# Patient Record
Sex: MALE | Race: WHITE | NOT HISPANIC OR LATINO | Employment: FULL TIME | URBAN - METROPOLITAN AREA
[De-identification: names, ages, dates, MRNs, and addresses within clinical notes are randomized per-mention and may not be internally consistent; named-entity substitution may affect disease eponyms.]

---

## 2019-07-01 DIAGNOSIS — M79.672 BILATERAL FOOT PAIN: Primary | ICD-10-CM

## 2019-07-01 DIAGNOSIS — M79.671 BILATERAL FOOT PAIN: Primary | ICD-10-CM

## 2019-07-02 ENCOUNTER — OFFICE VISIT (OUTPATIENT)
Dept: ORTHOPEDICS | Facility: CLINIC | Age: 29
End: 2019-07-02
Payer: OTHER MISCELLANEOUS

## 2019-07-02 ENCOUNTER — TELEPHONE (OUTPATIENT)
Dept: ORTHOPEDICS | Facility: CLINIC | Age: 29
End: 2019-07-02

## 2019-07-02 ENCOUNTER — HOSPITAL ENCOUNTER (OUTPATIENT)
Dept: RADIOLOGY | Facility: HOSPITAL | Age: 29
Discharge: HOME | End: 2019-07-02
Attending: ORTHOPAEDIC SURGERY
Payer: OTHER MISCELLANEOUS

## 2019-07-02 VITALS — WEIGHT: 263 LBS | HEIGHT: 72 IN | BODY MASS INDEX: 35.62 KG/M2

## 2019-07-02 DIAGNOSIS — M79.672 BILATERAL FOOT PAIN: ICD-10-CM

## 2019-07-02 DIAGNOSIS — M79.671 BILATERAL FOOT PAIN: ICD-10-CM

## 2019-07-02 DIAGNOSIS — F17.210 CIGARETTE NICOTINE DEPENDENCE WITHOUT COMPLICATION: ICD-10-CM

## 2019-07-02 DIAGNOSIS — S92.046A: ICD-10-CM

## 2019-07-02 PROCEDURE — 99204 OFFICE O/P NEW MOD 45 MIN: CPT | Mod: 57,S$GLB,, | Performed by: ORTHOPAEDIC SURGERY

## 2019-07-02 PROCEDURE — 99204 PR OFFICE/OUTPT VISIT, NEW, LEVL IV, 45-59 MIN: ICD-10-PCS | Mod: 57,S$GLB,, | Performed by: ORTHOPAEDIC SURGERY

## 2019-07-02 PROCEDURE — 73630 X-RAY EXAM OF FOOT: CPT | Mod: 50,TC,PO

## 2019-07-02 PROCEDURE — 99406 PR TOBACCO USE CESSATION INTERMEDIATE 3-10 MINUTES: ICD-10-PCS | Mod: S$GLB,,, | Performed by: ORTHOPAEDIC SURGERY

## 2019-07-02 PROCEDURE — 28400 PR CLOSED RX HEEL FX: ICD-10-PCS | Mod: 50,S$GLB,, | Performed by: ORTHOPAEDIC SURGERY

## 2019-07-02 PROCEDURE — 73630 X-RAY EXAM OF FOOT: CPT | Mod: 26,50,, | Performed by: RADIOLOGY

## 2019-07-02 PROCEDURE — 99406 BEHAV CHNG SMOKING 3-10 MIN: CPT | Mod: S$GLB,,, | Performed by: ORTHOPAEDIC SURGERY

## 2019-07-02 PROCEDURE — 97760 PR ORTHOTIC MGMT&TRAINJ INITIAL ENC EA 15 MINS: ICD-10-PCS | Mod: GP,S$GLB,, | Performed by: ORTHOPAEDIC SURGERY

## 2019-07-02 PROCEDURE — 73630 XR FOOT COMPLETE 3 VIEW BILATERAL: ICD-10-PCS | Mod: 26,50,, | Performed by: RADIOLOGY

## 2019-07-02 PROCEDURE — 28400 CLTX CALCANEAL FX W/O MNPJ: CPT | Mod: 50,S$GLB,, | Performed by: ORTHOPAEDIC SURGERY

## 2019-07-02 PROCEDURE — 97760 ORTHOTIC MGMT&TRAING 1ST ENC: CPT | Mod: GP,S$GLB,, | Performed by: ORTHOPAEDIC SURGERY

## 2019-07-02 PROCEDURE — 99999 PR PBB SHADOW E&M-EST. PATIENT-LVL II: CPT | Mod: PBBFAC,,, | Performed by: ORTHOPAEDIC SURGERY

## 2019-07-02 PROCEDURE — 99999 PR PBB SHADOW E&M-EST. PATIENT-LVL II: ICD-10-PCS | Mod: PBBFAC,,, | Performed by: ORTHOPAEDIC SURGERY

## 2019-07-02 RX ORDER — OXYCODONE AND ACETAMINOPHEN 7.5; 325 MG/1; MG/1
1 TABLET ORAL EVERY 6 HOURS PRN
Qty: 22 TABLET | Refills: 0 | Status: ON HOLD | OUTPATIENT
Start: 2019-07-02 | End: 2020-07-17 | Stop reason: SDUPTHER

## 2019-07-02 RX ORDER — OXYCODONE AND ACETAMINOPHEN 5; 325 MG/1; MG/1
5-325 TABLET ORAL EVERY 6 HOURS PRN
Refills: 0 | COMMUNITY
Start: 2019-06-26 | End: 2019-07-02 | Stop reason: DRUGHIGH

## 2019-07-02 NOTE — TELEPHONE ENCOUNTER
----- Message from Greg Rapp sent at 7/2/2019 11:05 AM CDT -----  Contact: Velia /   Needs a work release for pt. [phone].

## 2019-07-02 NOTE — TELEPHONE ENCOUNTER
Spoke to pt's aunt. Notified that he is not to work until follow up with us on 7/23/19. Aunt stated understanding.
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
## 2019-07-16 DIAGNOSIS — M25.562 PAIN IN BOTH KNEES, UNSPECIFIED CHRONICITY: Primary | ICD-10-CM

## 2019-07-16 DIAGNOSIS — M25.561 PAIN IN BOTH KNEES, UNSPECIFIED CHRONICITY: Primary | ICD-10-CM

## 2019-07-19 DIAGNOSIS — S92.046A: Primary | ICD-10-CM

## 2019-07-22 ENCOUNTER — TELEPHONE (OUTPATIENT)
Dept: ORTHOPEDICS | Facility: CLINIC | Age: 29
End: 2019-07-22

## 2019-07-23 ENCOUNTER — HOSPITAL ENCOUNTER (OUTPATIENT)
Dept: RADIOLOGY | Facility: HOSPITAL | Age: 29
Discharge: HOME | End: 2019-07-23
Attending: ORTHOPAEDIC SURGERY
Payer: OTHER MISCELLANEOUS

## 2019-07-23 ENCOUNTER — OFFICE VISIT (OUTPATIENT)
Dept: ORTHOPEDICS | Facility: CLINIC | Age: 29
End: 2019-07-23
Payer: OTHER MISCELLANEOUS

## 2019-07-23 VITALS
HEART RATE: 74 BPM | WEIGHT: 263 LBS | SYSTOLIC BLOOD PRESSURE: 120 MMHG | HEIGHT: 72 IN | BODY MASS INDEX: 35.62 KG/M2 | DIASTOLIC BLOOD PRESSURE: 77 MMHG

## 2019-07-23 DIAGNOSIS — S92.046A: ICD-10-CM

## 2019-07-23 DIAGNOSIS — S92.034D CLOSED NONDISPLACED AVULSION FRACTURE OF TUBEROSITY OF RIGHT CALCANEUS WITH ROUTINE HEALING, SUBSEQUENT ENCOUNTER: Primary | ICD-10-CM

## 2019-07-23 PROCEDURE — 99024 PR POST-OP FOLLOW-UP VISIT: ICD-10-PCS | Mod: S$GLB,,, | Performed by: ORTHOPAEDIC SURGERY

## 2019-07-23 PROCEDURE — 99999 PR PBB SHADOW E&M-EST. PATIENT-LVL III: CPT | Mod: PBBFAC,,, | Performed by: ORTHOPAEDIC SURGERY

## 2019-07-23 PROCEDURE — 73650 X-RAY EXAM OF HEEL: CPT | Mod: 50,TC,PO

## 2019-07-23 PROCEDURE — 73650 XR CALCANEUS BILATERAL: ICD-10-PCS | Mod: 26,50,, | Performed by: RADIOLOGY

## 2019-07-23 PROCEDURE — 73650 X-RAY EXAM OF HEEL: CPT | Mod: 26,50,, | Performed by: RADIOLOGY

## 2019-07-23 PROCEDURE — 99999 PR PBB SHADOW E&M-EST. PATIENT-LVL III: ICD-10-PCS | Mod: PBBFAC,,, | Performed by: ORTHOPAEDIC SURGERY

## 2019-07-23 PROCEDURE — 99024 POSTOP FOLLOW-UP VISIT: CPT | Mod: S$GLB,,, | Performed by: ORTHOPAEDIC SURGERY
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
## 2019-08-08 ENCOUNTER — TELEPHONE (OUTPATIENT)
Dept: ORTHOPEDICS | Facility: CLINIC | Age: 29
End: 2019-08-08

## 2019-08-08 NOTE — TELEPHONE ENCOUNTER
----- Message from Fabian Horner sent at 8/8/2019  3:11 PM CDT -----  Contact: Velia (aunt)  Name of Who is Calling: Velia (aunt)      What is the request in detail: Would like to speak with staff in regards to setting up worker's comp appt. Please advise      Can the clinic reply by [facility]: no      [facility]: [phone]

## 2019-08-13 ENCOUNTER — TELEPHONE (OUTPATIENT)
Dept: ORTHOPEDICS | Facility: CLINIC | Age: 29
End: 2019-08-13

## 2019-08-13 NOTE — TELEPHONE ENCOUNTER
----- Message from Mamie Maher sent at 8/13/2019 11:25 AM CDT -----  Contact: Aunt Velia   Type:  Patient Returning Call    Who Called:  Aunt Velia   Who Left Message for Patient:  Shasta Bedolla   Does the patient know what this is regarding?:  Yes, workers comp appt   Best Call Back Number: [phone] (home)   Additional Information:  Returning call from 8/8

## 2019-08-22 ENCOUNTER — OFFICE VISIT (OUTPATIENT)
Dept: ORTHOPEDICS | Facility: CLINIC | Age: 29
End: 2019-08-22
Payer: OTHER MISCELLANEOUS

## 2019-08-22 ENCOUNTER — HOSPITAL ENCOUNTER (OUTPATIENT)
Dept: RADIOLOGY | Facility: HOSPITAL | Age: 29
Discharge: HOME | End: 2019-08-22
Attending: ORTHOPAEDIC SURGERY
Payer: OTHER MISCELLANEOUS

## 2019-08-22 VITALS — BODY MASS INDEX: 35.62 KG/M2 | HEIGHT: 72 IN | WEIGHT: 263 LBS

## 2019-08-22 DIAGNOSIS — S83.90XA SPRAIN OF KNEE, UNSPECIFIED LATERALITY, UNSPECIFIED LIGAMENT, INITIAL ENCOUNTER: Primary | ICD-10-CM

## 2019-08-22 DIAGNOSIS — M25.561 PAIN IN BOTH KNEES, UNSPECIFIED CHRONICITY: ICD-10-CM

## 2019-08-22 DIAGNOSIS — M25.562 PAIN IN BOTH KNEES, UNSPECIFIED CHRONICITY: ICD-10-CM

## 2019-08-22 PROCEDURE — 73564 X-RAY EXAM KNEE 4 OR MORE: CPT | Mod: TC,50,PN

## 2019-08-22 PROCEDURE — 73564 X-RAY EXAM KNEE 4 OR MORE: CPT | Mod: 26,50,, | Performed by: RADIOLOGY

## 2019-08-22 PROCEDURE — 99203 PR OFFICE/OUTPT VISIT, NEW, LEVL III, 30-44 MIN: ICD-10-PCS | Mod: S$GLB,,, | Performed by: ORTHOPAEDIC SURGERY

## 2019-08-22 PROCEDURE — 99999 PR PBB SHADOW E&M-EST. PATIENT-LVL II: CPT | Mod: PBBFAC,,, | Performed by: ORTHOPAEDIC SURGERY

## 2019-08-22 PROCEDURE — 73564 XR KNEE ORTHO BILAT WITH FLEXION: ICD-10-PCS | Mod: 26,50,, | Performed by: RADIOLOGY

## 2019-08-22 PROCEDURE — 99999 PR PBB SHADOW E&M-EST. PATIENT-LVL II: ICD-10-PCS | Mod: PBBFAC,,, | Performed by: ORTHOPAEDIC SURGERY

## 2019-08-22 PROCEDURE — 99203 OFFICE O/P NEW LOW 30 MIN: CPT | Mod: S$GLB,,, | Performed by: ORTHOPAEDIC SURGERY

## 2019-08-23 DIAGNOSIS — S92.046A: ICD-10-CM

## 2019-08-23 DIAGNOSIS — S92.034D CLOSED NONDISPLACED AVULSION FRACTURE OF TUBEROSITY OF RIGHT CALCANEUS WITH ROUTINE HEALING, SUBSEQUENT ENCOUNTER: Primary | ICD-10-CM
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
## 2019-09-05 ENCOUNTER — HOSPITAL ENCOUNTER (OUTPATIENT)
Dept: RADIOLOGY | Facility: HOSPITAL | Age: 29
Discharge: HOME | End: 2019-09-05
Attending: ORTHOPAEDIC SURGERY
Payer: OTHER MISCELLANEOUS

## 2019-09-05 ENCOUNTER — OFFICE VISIT (OUTPATIENT)
Dept: ORTHOPEDICS | Facility: CLINIC | Age: 29
End: 2019-09-05
Payer: OTHER MISCELLANEOUS

## 2019-09-05 VITALS
SYSTOLIC BLOOD PRESSURE: 123 MMHG | DIASTOLIC BLOOD PRESSURE: 73 MMHG | HEART RATE: 94 BPM | HEIGHT: 72 IN | BODY MASS INDEX: 35.62 KG/M2 | WEIGHT: 263 LBS

## 2019-09-05 DIAGNOSIS — S92.034D CLOSED NONDISPLACED AVULSION FRACTURE OF TUBEROSITY OF RIGHT CALCANEUS WITH ROUTINE HEALING, SUBSEQUENT ENCOUNTER: Primary | ICD-10-CM

## 2019-09-05 DIAGNOSIS — S92.046A: ICD-10-CM

## 2019-09-05 DIAGNOSIS — S92.034D CLOSED NONDISPLACED AVULSION FRACTURE OF TUBEROSITY OF RIGHT CALCANEUS WITH ROUTINE HEALING, SUBSEQUENT ENCOUNTER: ICD-10-CM

## 2019-09-05 PROCEDURE — 73650 X-RAY EXAM OF HEEL: CPT | Mod: 50,TC,PO

## 2019-09-05 PROCEDURE — 99024 PR POST-OP FOLLOW-UP VISIT: ICD-10-PCS | Mod: S$GLB,,, | Performed by: ORTHOPAEDIC SURGERY

## 2019-09-05 PROCEDURE — 99024 POSTOP FOLLOW-UP VISIT: CPT | Mod: S$GLB,,, | Performed by: ORTHOPAEDIC SURGERY

## 2019-09-05 PROCEDURE — 99999 PR PBB SHADOW E&M-EST. PATIENT-LVL III: ICD-10-PCS | Mod: PBBFAC,,, | Performed by: ORTHOPAEDIC SURGERY

## 2019-09-05 PROCEDURE — 73650 X-RAY EXAM OF HEEL: CPT | Mod: 26,50,, | Performed by: RADIOLOGY

## 2019-09-05 PROCEDURE — 99999 PR PBB SHADOW E&M-EST. PATIENT-LVL III: CPT | Mod: PBBFAC,,, | Performed by: ORTHOPAEDIC SURGERY

## 2019-09-05 PROCEDURE — 73650 XR CALCANEUS BILATERAL: ICD-10-PCS | Mod: 26,50,, | Performed by: RADIOLOGY
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
## 2019-09-18 NOTE — PROGRESS NOTES
Please see the below listed initial evaluation and POC. Thank you for your consult.    Clayton Delgado, PT, DPT

## 2019-09-19 ENCOUNTER — CLINICAL SUPPORT (OUTPATIENT)
Dept: REHABILITATION | Facility: HOSPITAL | Age: 29
End: 2019-09-19
Attending: ORTHOPAEDIC SURGERY
Payer: OTHER MISCELLANEOUS

## 2019-09-19 DIAGNOSIS — M25.562 CHRONIC PAIN OF BOTH KNEES: ICD-10-CM

## 2019-09-19 DIAGNOSIS — G89.29 CHRONIC PAIN OF BOTH KNEES: ICD-10-CM

## 2019-09-19 DIAGNOSIS — R29.898 WEAKNESS OF BOTH LOWER EXTREMITIES: Primary | ICD-10-CM

## 2019-09-19 DIAGNOSIS — M25.561 CHRONIC PAIN OF BOTH KNEES: ICD-10-CM

## 2019-09-19 PROCEDURE — 97161 PT EVAL LOW COMPLEX 20 MIN: CPT | Mod: PO

## 2019-09-19 PROCEDURE — 97110 THERAPEUTIC EXERCISES: CPT | Mod: PO

## 2019-09-19 NOTE — PLAN OF CARE
"[facility]  Physical Therapy Initial Evaluation    Name: Nabil Cristobal  Clinic Number: [id number]    Therapy Diagnosis:   Encounter Diagnoses   Name Primary?    Weakness of both lower extremities Yes    Chronic pain of both knees      Physician: Stanley Browning MD    Physician Orders: PT Eval and Treat  Medical Diagnosis from Referral: Closed nondisplaced avulsion fracture of tuberosity of right calcaneus with routine healing, subsequent encounter  Evaluation Date: 9/19/2019  Authorization Period Expiration: 10/12/2019  Plan of Care Expiration: 11/2019  Visit # / Visits authorized: 1/12    Time In: 1101  Time Out: 1159  Total Billable Time: 58 minutes    Precautions: Standard    Subjective   Date of onset: June 2019  History of current condition - Nabil reports that he fell about 10' off a ladder at work and his knees "went out" earlier this summer and he immediately went to the hospital afterwards. Pt reports he was bilat hard casts for about 3 weeks and then in bilat boots for about 6 weeks. Pt states he had f/u appt a couple of weeks ago and was instructed to stop wearing the boots. Pt reports that since initial accident he has also had bilat knee pain and reports hx of knee pain prior to accident. Pt reports imaging showed general bilat degeneration both no other structural issues. Pt reports knees have been waking him up at night and feel like they are "on fire." Pt reports L knee feels weaker than R knee. Pt reports that at this time he is able to ambulate well w/o any AD but has limited activity tolerance and can only ambulate certain distances before symptoms are too great.     Medical History:   Past Medical History:   Diagnosis Date    ADHD (attention deficit hyperactivity disorder)     Anxiety     Depression     PTSD (post-traumatic stress disorder)     Rage      Surgical History:   Nabil Cristobal  has a past surgical history that includes Tonsillectomy; " Adenoidectomy; and Sinus surgery.    Medications:   Nabil has a current medication list which includes the following prescription(s): epinephrine, ibuprofen, and oxycodone-acetaminophen.    Allergies:   Review of patient's allergies indicates:   Allergen Reactions    Pcn [penicillins] Anaphylaxis    Codeine       Imaging, XR (bilat ankles): FINDINGS:  Essentially nondisplaced fractures of the posterior aspects of the calcanei bilaterally.  Right is less apparent today than on the prior exam consistent with some interval healing.  Left is also slightly less apparent today consistent with some interval healing.     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  Prior Level of Function: Independent and asymptomatic w/ all work activities and ADLs  Current Level of Function: Pt is currently limited w/ ADLs by symptom tolerance and is unable to work at this time    Pain:  Current 2/10, worst 6/10, best 1/10   Location: bilateral ankles and feet   Description: Aching, Dull and Throbbing  Aggravating Factors: walking extended distances  Easing Factors: rest and elevation    Pts goals: Pt reports primary goal is to return to work in [employer] on unrestricted duty at [employer] [facility] [employer] [facility]    Objective     Observation: No acute distress    Posture: WNL throughout    Gait: Pt ambulates w/ decreased gait speed and increased lateral shifting in effort to unload bilat foot/ankle; pt w/o boots or AD on either extremity    ROM:  Ankle Left Right   Dorsiflexion 9 (9) degrees 8 (8) degrees   Plantarflexion 53 (55) degrees 53 (55) degrees   Inversion 25 (25) degrees 25 (25) degrees   Eversion 3 (3) degrees 3 (3) degrees     MMT:    Ankle Left Right   Dorsiflexion 5/5 5/5   Plantarflexion 5/5 5/5   Inversion 5/5 5/5   Eversion 5/5 5/5     LE Left Right   Hip flexion 5/5 5/5   Hip abd 5/5 5/5   Hip add 5/5 5/5   Knee ext  "5/5 5/5   Knee flex 5/5 5/5     Special Tests:    Ankle Left Right   Anterior Drawer Test Negative Negative   Squeeze test Negative Negative   Elaine test Negative Negative     Joint Mobility: WNL and symmetrical bilaterally at talocrural, subtalar, and midtarsal joints; pt w/ some crepitus/grinding in L knee joint but mostly asymptomatic; pt w/ mild medial knee irritation w/ valgus pressure w/ knee flexed ~30 degrees    Palpation: mild TTP over posteroinferior aspect of L heel; otherwise w/o tenderness    Sensation: pt reports standing hx of altered temperature sensation in bilat knee (L > R) pattern d/t lumbar hx; otherwise WNL and symmetrical bilaterally    Flexibility: mild decrease in bilat hamstring length; otherwise WNL    CMS Impairment/Limitation/Restriction for FOTO Ankle Survey    Therapist reviewed FOTO scores for Nabil Cristobal on 9/19/2019.   FOTO documents entered into [employer] - see Media section.    Limitation Score: 64%  Category: Mobility         TREATMENT   Treatment Time In: 1130  Treatment Time Out: 1150  Total Treatment time separate from Evaluation: 20 minutes    Nabil received therapeutic exercises to develop strength, endurance and ROM for 15 minutes including:  - Deficit tempo heel raise 3x10 (3131 tempo) (bilat UE support)  - Eccentric lateral step down (8") 2x10 each (VC for forward knee travel and quadriceps loading)  - Kickstand SL sit-to-stand 2x10 each (elevated mat table, alt LE "kickstand" for balance)    Home Exercises and Patient Education Provided    Education provided:   - Role of PT, PT POC, graded exposure to loading and ambulation to restore functional mobility, role of LE strengthening to alleviate bilat LE symptoms and improve function    Written Home Exercises Provided: yes.  Exercises were reviewed and Nabil was able to demonstrate them prior to the end of the session.  Nabil demonstrated good  understanding of the education provided.     See EMR under Patient " Instructions for exercises provided 9/19/2019.    Assessment   Nabil is a 29 y.o. male referred to outpatient physical therapy with a medical diagnosis of closed nondisplaced avulsion fracture of tuberosity of right calcaneus with routine healing. Physical exam is consistent with medical dx. This patient's primary impairments include decreased tolerance to WB activity, decreased calf/ankle complex strength, increased bilat knee pain, and bilat knee extensor weakness. The pt is an excellent candidate for skilled physical therapy treatment and stands to benefit from a combination of therapeutic exercises to develop bilat LE ROM and strength, therapeutic activities to restore tolerance to WB and work activities, neuromuscular reeducation, manual therapy techniques, and therapeutic modalities as needed. The pt has been educated on their diagnosis and POC and consents to further treatment.    Pt prognosis is Good.   Pt will benefit from skilled outpatient physical therapy to address the deficits listed above and in the chart below, provide pt/family education, and to maximize pt's level of independence.     Plan of care discussed with patient: Yes  Pt's spiritual, cultural and educational needs considered and patient is agreeable to the plan of care and goals as stated below:     Anticipated Barriers for therapy: none noted    Medical Necessity is demonstrated by the following  History  Co-morbidities and personal factors that may impact the plan of care Co-morbidities:   Nicotine dependence without complication    Personal Factors:   social background  lifestyle     low   Examination  Body Structures and Functions, activity limitations and participation restrictions that may impact the plan of care Body Regions:   lower extremities    Body Systems:    gross symmetry  ROM  strength  gross coordinated movement  balance  gait  motor control    Participation Restrictions:   Unable to work, decreased tolerance to community  ambulation    Activity limitations:   Learning and applying knowledge  no deficits    General Tasks and Commands  no deficits    Communication  no deficits    Mobility  lifting and carrying objects  walking    Self care  no deficits    Domestic Life  shopping    Interactions/Relationships  no deficits    Life Areas  no deficits    Community and Social Life  no deficits         low   Clinical Presentation stable and uncomplicated low   Decision Making/ Complexity Score: low     Goals:  Short-Term Goals: 2 weeks  1) The patient will be independent and compliant with initial home exercise program as prescribed by physical therapist.  2) The patient will report ability to ambulate 0.5 miles w/o any AD and pain <5/10 at worst to demonstrate improved tolerance to community ambulation.  3) The patient will complete 15 SL calf raises on each LE to demonstrate improved strength and stability of foot/ankle complex.    Long-Term Goals: 4 weeks  1) Pt to achieve <30% limitation as measured by the FOTO to demonstrate decreased disability.  2) The patient will report ability to ambulate 1 mile w/o any AD and pain <5/10 at worst to demonstrate improved tolerance to community ambulation.  3) The patient will complete 20 SL calf raises on each LE to demonstrate improved strength and stability of foot/ankle complex.    Plan   Plan of care Certification: 9/19/2019 to 11/1/2019.    Outpatient Physical Therapy 2 times weekly for 4 weeks to include the following interventions: Gait Training, Manual Therapy, Neuromuscular Re-ed, Patient Education, Self Care, Therapeutic Activites and Therapeutic Exercise.     Clayton Delgado, PT

## 2019-09-24 ENCOUNTER — CLINICAL SUPPORT (OUTPATIENT)
Dept: REHABILITATION | Facility: HOSPITAL | Age: 29
End: 2019-09-24
Attending: ORTHOPAEDIC SURGERY
Payer: OTHER MISCELLANEOUS

## 2019-09-24 DIAGNOSIS — G89.29 CHRONIC PAIN OF BOTH KNEES: ICD-10-CM

## 2019-09-24 DIAGNOSIS — M25.562 CHRONIC PAIN OF BOTH KNEES: ICD-10-CM

## 2019-09-24 DIAGNOSIS — R29.898 WEAKNESS OF BOTH LOWER EXTREMITIES: ICD-10-CM

## 2019-09-24 DIAGNOSIS — M25.561 CHRONIC PAIN OF BOTH KNEES: ICD-10-CM

## 2019-09-24 PROCEDURE — 97110 THERAPEUTIC EXERCISES: CPT | Mod: PO

## 2019-09-24 NOTE — PROGRESS NOTES
"  Physical Therapy Daily Treatment Note     Name: Nabil Cristobal  Clinic Number: [id number]    Therapy Diagnosis:   Encounter Diagnoses   Name Primary?    Weakness of both lower extremities     Chronic pain of both knees      Physician: Stanley Browning MD    Visit Date: 9/24/2019  Physician Orders: PT Eval and Treat  Medical Diagnosis from Referral: Closed nondisplaced avulsion fracture of tuberosity of right calcaneus with routine healing, subsequent encounter  Evaluation Date: 9/19/2019  Authorization Period Expiration: 10/12/2019  Plan of Care Expiration: 11/2019  Visit # / Visits authorized: 2/12     Time In: 1000  Time Out: 1100  Total Billable Time: 30 minutes     Precautions: Standard    Subjective     Pt reports: No pain, just soreness.  Was busy yesterday around the house so he thinks that is why he is sore today.  Pt reports he can stand around 2 hours before he starts to have the throbbing pain in B heels.  Pt reports pain is the same intensity on both sides.  He was compliant with home exercise program.  Response to previous treatment: very sore  Functional change: too soon to tell    Pain: 2/10  Location: bilateral ankles and feet     Objective     Nabil received therapeutic exercises to develop strength, endurance and flexibility for 50 minutes including:    -Stationary Bike x 5 min  -HS stretch at stairs 2 x 1'  - Gastroc stretch at incline x 2'  -Shuttle Leg press B LE 6 black bands, 1 red x 20, SL 3 black bands x 20  - Leg extension machine #70 x 20  - Side stepping with BTB band x 2  - Deficit tempo heel raise 3x12 (3131 tempo) (bilat UE support)  - Eccentric lateral step down (6") 2x10 each (VC for forward knee travel and quadriceps loading)* displayed increased fatigue and forward leaning so step height had to be decreased  - Kickstand SL sit-to-stand 2x10 each (elevated mat table, alt LE "kickstand" for balance)- R LE noted bad form       Home Exercises Provided and Patient Education " Provided     Education provided:   - HEP    Written Home Exercises Provided: Patient instructed to cont prior HEP.  Exercises were reviewed and Nabil was able to demonstrate them prior to the end of the session.  Nabil demonstrated good  understanding of the education provided.     See EMR under Patient Instructions for exercises provided prior visit.    Assessment     Pt with good tolerance of additional LE exercises today.  Step downs and SL sit to stands were performed at the end of treatment and by this time, patient was very fatigued and displayed poor form.  Will attempt these exercises at the beginning of the session to see if form improves.  Nabil is progressing well towards his goals.   Pt prognosis is Good.     Pt will continue to benefit from skilled outpatient physical therapy to address the deficits listed in the problem list box on initial evaluation, provide pt/family education and to maximize pt's level of independence in the home and community environment.     Pt's spiritual, cultural and educational needs considered and pt agreeable to plan of care and goals.    Anticipated barriers to physical therapy: None noted    Goals:     Short-Term Goals: 2 weeks  1) The patient will be independent and compliant with initial home exercise program as prescribed by physical therapist.  2) The patient will report ability to ambulate 0.5 miles w/o any AD and pain <5/10 at worst to demonstrate improved tolerance to community ambulation.  3) The patient will complete 15 SL calf raises on each LE to demonstrate improved strength and stability of foot/ankle complex.     Long-Term Goals: 4 weeks  1) Pt to achieve <30% limitation as measured by the FOTO to demonstrate decreased disability.  2) The patient will report ability to ambulate 1 mile w/o any AD and pain <5/10 at worst to demonstrate improved tolerance to community ambulation.  3) The patient will complete 20 SL calf raises on each LE to demonstrate  improved strength and stability of foot/ankle complex.    Plan     Continue with FANNIE Snow, PTA

## 2019-09-26 ENCOUNTER — CLINICAL SUPPORT (OUTPATIENT)
Dept: REHABILITATION | Facility: HOSPITAL | Age: 29
End: 2019-09-26
Payer: OTHER MISCELLANEOUS

## 2019-09-26 DIAGNOSIS — G89.29 CHRONIC PAIN OF BOTH KNEES: ICD-10-CM

## 2019-09-26 DIAGNOSIS — M25.562 CHRONIC PAIN OF BOTH KNEES: ICD-10-CM

## 2019-09-26 DIAGNOSIS — R29.898 WEAKNESS OF BOTH LOWER EXTREMITIES: ICD-10-CM

## 2019-09-26 DIAGNOSIS — M25.561 CHRONIC PAIN OF BOTH KNEES: ICD-10-CM

## 2019-09-26 PROCEDURE — 97110 THERAPEUTIC EXERCISES: CPT | Mod: PO

## 2019-09-26 NOTE — PROGRESS NOTES
"  Physical Therapy Daily Treatment Note     Name: Nabil Cristobal  Clinic Number: [id number]    Therapy Diagnosis:   Encounter Diagnoses   Name Primary?    Weakness of both lower extremities     Chronic pain of both knees      Physician: Stanley Browning MD    Visit Date: 9/26/2019  Physician Orders: PT Eval and Treat  Medical Diagnosis from Referral: Closed nondisplaced avulsion fracture of tuberosity of right calcaneus with routine healing, subsequent encounter  Evaluation Date: 9/19/2019  Authorization Period Expiration: 10/12/2019  Plan of Care Expiration: 11/2019  Visit # / Visits authorized: 2/12     Time In: 1000  Time Out: 1100  Total Billable Time: 60 minutes     Precautions: Standard    Subjective     Pt reports:just soreness, no pain.  He was compliant with home exercise program.  Response to previous treatment: very sore  Functional change: too soon to tell    Pain: 0/10  Location: bilateral ankles and feet     Objective     Nabil received therapeutic exercises to develop strength, endurance and flexibility for 60 minutes including:    -Stationary Bike x 5 min  -HS stretch at stairs 2 x 1'  - Gastroc stretch at incline x 2'  -Shuttle Leg press B LE 6 black bands, 1 red x 20, SL 3 black bands x 20- NP  - Leg press #100 B LE, #50 SL eccentric lower x 20  - Leg extension machine #70 x 20  - Side stepping with BTB band x 2  - Deficit tempo heel raise 3x12 (3131 tempo) (bilat UE support)  - Eccentric lateral step down (6") 2x10 each (VC for forward knee travel and quadriceps loading)* displayed increased fatigue and forward leaning, requires UE support  - Kickstand SL sit-to-stand 2x10 each (elevated mat table, alt LE "kickstand" for balance)- multiple breaks during reps secondary to fatigue      Home Exercises Provided and Patient Education Provided     Education provided:   - HEP    Written Home Exercises Provided: Patient instructed to cont prior HEP.  Exercises were reviewed and Nabil was able to " demonstrate them prior to the end of the session.  Nabil demonstrated good  understanding of the education provided.     See EMR under Patient Instructions for exercises provided prior visit.    Assessment     Pt with good tolerance of additional LE exercises today. Single sit to stand was performed towards the beginning of treatment and patient still had a lot of difficulty with muscle fatigue.  Pt displayed and verbalized increased fatigue and soreness at the end of treatment.  Will continue progressing towards strengthening goals as tolerated.  Nabil is progressing well towards his goals.   Pt prognosis is Good.     Pt will continue to benefit from skilled outpatient physical therapy to address the deficits listed in the problem list box on initial evaluation, provide pt/family education and to maximize pt's level of independence in the home and community environment.     Pt's spiritual, cultural and educational needs considered and pt agreeable to plan of care and goals.    Anticipated barriers to physical therapy: None noted    Goals:     Short-Term Goals: 2 weeks  1) The patient will be independent and compliant with initial home exercise program as prescribed by physical therapist.  2) The patient will report ability to ambulate 0.5 miles w/o any AD and pain <5/10 at worst to demonstrate improved tolerance to community ambulation.  3) The patient will complete 15 SL calf raises on each LE to demonstrate improved strength and stability of foot/ankle complex.     Long-Term Goals: 4 weeks  1) Pt to achieve <30% limitation as measured by the FOTO to demonstrate decreased disability.  2) The patient will report ability to ambulate 1 mile w/o any AD and pain <5/10 at worst to demonstrate improved tolerance to community ambulation.  3) The patient will complete 20 SL calf raises on each LE to demonstrate improved strength and stability of foot/ankle complex.    Plan     Continue with FANNIE Snow  PTA

## 2019-10-01 NOTE — PROGRESS NOTES
"  Physical Therapy Daily Treatment Note     Name: Nabil Cristobal  Clinic Number: [id number]    Therapy Diagnosis:   Encounter Diagnoses   Name Primary?    Weakness of both lower extremities     Chronic pain of both knees      Physician: Stanley Browning MD    Visit Date: 10/2/2019  Physician Orders: PT Eval and Treat  Medical Diagnosis from Referral: Closed nondisplaced avulsion fracture of tuberosity of right calcaneus with routine healing, subsequent encounter  Evaluation Date: 9/19/2019  Authorization Period Expiration: 10/12/2019  Plan of Care Expiration: 11/1/2019  Visit # / Visits authorized: 4/12     Time In: 1001  Time Out: 1059  Total Billable Time: 54 minutes     Precautions: Standard    Subjective     Pt reports that he is doing well on this day and that last session w/ Savana went well. Pt reports that he feels he is progressing well outside of therapy at this time and is ready to go back to work.  He was compliant with home exercise program.  Response to previous treatment: mild soreness, no adverse effects  Functional change: improved tolerance to ambulation and long distances    Pain: 0/10  Location: bilateral ankles and feet     Objective     Nabil received therapeutic exercises to develop strength, endurance and flexibility for 54 minutes including:  - Stationary Bike x 5 min  - Supine HS stretch w/ strap x60" each  - Slant board gastroc stretch x60" each  - TB lateral shuffle [id number] (red)  - TB monster walk [id number] (red)  - TRX assisted reverse lunge 3x8 each  - KB deadlift 3x6 (35#)  - Sled push [id number] (50#)  - Deficit tempo heel raise 3x15 (31X1 tempo) (bilat UE support)  - KB seated heel raise 3x15 (25# each)    Home Exercises Provided and Patient Education Provided     Education provided:   - Impact of deconditioning d/t NWB status after injury, progression of strengthening interventions    Written Home Exercises Provided: Patient instructed to cont prior HEP.  Exercises were reviewed " and Nabil was able to demonstrate them prior to the end of the session.  Nabil demonstrated good  understanding of the education provided.     See EMR under Patient Instructions for exercises provided prior visit.    Assessment     Pt demo good progress on this date w/ regards to activity tolerance. Pt able to tolerate all interventions on this date w/o issue, but still demo significant deconditioning, requiring modifications and increased rest breaks d/t SOB and fatigue. Pt continues to progress well w/ direct loading to bilat calves and demo improvements in local muscular strength and fatigue resistance.    Nabil is progressing well towards his goals.   Pt prognosis is Good.     Pt will continue to benefit from skilled outpatient physical therapy to address the deficits listed in the problem list box on initial evaluation, provide pt/family education and to maximize pt's level of independence in the home and community environment.     Pt's spiritual, cultural and educational needs considered and pt agreeable to plan of care and goals.    Anticipated barriers to physical therapy: None noted    Goals:     Short-Term Goals: 2 weeks  1) The patient will be independent and compliant with initial home exercise program as prescribed by physical therapist. MET 10/2/2019  2) The patient will report ability to ambulate 0.5 miles w/o any AD and pain <5/10 at worst to demonstrate improved tolerance to community ambulation. MET 10/2/2019  3) The patient will complete 15 SL calf raises on each LE to demonstrate improved strength and stability of foot/ankle complex. PROGRESSING     Long-Term Goals: 4 weeks  1) Pt to achieve <30% limitation as measured by the FOTO to demonstrate decreased disability. PROGRESSING  2) The patient will report ability to ambulate 1 mile w/o any AD and pain <5/10 at worst to demonstrate improved tolerance to community ambulation. PROGRESSING  3) The patient will complete 20 SL calf raises on  each LE to demonstrate improved strength and stability of foot/ankle complex. PROGRESSING    Plan     Continue with current POC addressing strength and ROM of bilat foot/ankle complex and tolerance to repetitive weight bearing and work related tasks    Clayton Delgado, PT

## 2019-10-02 ENCOUNTER — CLINICAL SUPPORT (OUTPATIENT)
Dept: REHABILITATION | Facility: HOSPITAL | Age: 29
End: 2019-10-02
Attending: ORTHOPAEDIC SURGERY
Payer: OTHER MISCELLANEOUS

## 2019-10-02 DIAGNOSIS — M25.562 CHRONIC PAIN OF BOTH KNEES: ICD-10-CM

## 2019-10-02 DIAGNOSIS — M25.561 CHRONIC PAIN OF BOTH KNEES: ICD-10-CM

## 2019-10-02 DIAGNOSIS — R29.898 WEAKNESS OF BOTH LOWER EXTREMITIES: ICD-10-CM

## 2019-10-02 DIAGNOSIS — G89.29 CHRONIC PAIN OF BOTH KNEES: ICD-10-CM

## 2019-10-02 PROCEDURE — 97110 THERAPEUTIC EXERCISES: CPT | Mod: PO

## 2019-10-03 ENCOUNTER — OFFICE VISIT (OUTPATIENT)
Dept: ORTHOPEDICS | Facility: CLINIC | Age: 29
End: 2019-10-03
Payer: OTHER MISCELLANEOUS

## 2019-10-03 VITALS — BODY MASS INDEX: 35.62 KG/M2 | WEIGHT: 263 LBS | HEIGHT: 72 IN

## 2019-10-03 DIAGNOSIS — M25.561 CHRONIC PAIN OF BOTH KNEES: Primary | ICD-10-CM

## 2019-10-03 DIAGNOSIS — G89.29 CHRONIC PAIN OF BOTH KNEES: Primary | ICD-10-CM

## 2019-10-03 DIAGNOSIS — M25.562 CHRONIC PAIN OF BOTH KNEES: Primary | ICD-10-CM

## 2019-10-03 PROCEDURE — 99999 PR PBB SHADOW E&M-EST. PATIENT-LVL II: CPT | Mod: PBBFAC,,, | Performed by: ORTHOPAEDIC SURGERY

## 2019-10-03 PROCEDURE — 99213 PR OFFICE/OUTPT VISIT, EST, LEVL III, 20-29 MIN: ICD-10-PCS | Mod: S$GLB,,, | Performed by: ORTHOPAEDIC SURGERY

## 2019-10-03 PROCEDURE — 99213 OFFICE O/P EST LOW 20 MIN: CPT | Mod: S$GLB,,, | Performed by: ORTHOPAEDIC SURGERY

## 2019-10-03 PROCEDURE — 99999 PR PBB SHADOW E&M-EST. PATIENT-LVL II: ICD-10-PCS | Mod: PBBFAC,,, | Performed by: ORTHOPAEDIC SURGERY

## 2019-10-03 NOTE — PROGRESS NOTES
Past Medical History:   Diagnosis Date    ADHD (attention deficit hyperactivity disorder)     Anxiety     Depression     PTSD (post-traumatic stress disorder)     Rage        Past Surgical History:   Procedure Laterality Date    ADENOIDECTOMY      SINUS SURGERY      TONSILLECTOMY         Current Outpatient Medications   Medication Sig    ibuprofen (ADVIL,MOTRIN) 800 MG tablet Take 1 tablet (800 mg total) by mouth every 6 (six) hours as needed for Pain.    oxyCODONE-acetaminophen (PERCOCET) 7.5-325 mg per tablet Take 1 tablet by mouth every 6 (six) hours as needed for Pain.    epinephrine (EPIPEN) 0.3 mg/0.3 mL AtIn Inject 0.3 mLs (0.3 mg total) into the muscle as needed.     No current facility-administered medications for this visit.        Review of patient's allergies indicates:   Allergen Reactions    Pcn [penicillins] Anaphylaxis    Codeine        Family History   Problem Relation Age of Onset    Mental illness Mother     Mental illness Maternal Uncle        Social History     Socioeconomic History    Marital status: Single     Spouse name: Not on file    Number of children: Not on file    Years of education: Not on file    Highest education level: Not on file   Occupational History    Not on file   Social Needs    Financial resource strain: Not on file    Food insecurity:     Worry: Not on file     Inability: Not on file    Transportation needs:     Medical: Not on file     Non-medical: Not on file   Tobacco Use    Smoking status: Current Every Day Smoker     Packs/day: 1.00     Types: Cigarettes   Substance and Sexual Activity    Alcohol use: Yes    Drug use: No    Sexual activity: Not on file   Lifestyle    Physical activity:     Days per week: Not on file     Minutes per session: Not on file    Stress: Not on file   Relationships    Social connections:     Talks on phone: Not on file     Gets together: Not on file     Attends Cheondoism service: Not on file     Active member of  club or organization: Not on file     Attends meetings of clubs or organizations: Not on file     Relationship status: Not on file   Other Topics Concern    Not on file   Social History Narrative    Not on file       Chief Complaint:   Chief Complaint   Patient presents with    Knee Pain     bilateral knee pain follow up post PT      Interval history:  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 Patient was involved in an accident on June 26th.  Patient fell off a ladder about 10 feet and landed hard onto his feet.  Since then he has had bilateral knee pain.  Patient had knee issues before but they have been worsened since then.  Has some paresthesias in both legs that existed prior to the injury.  Pain is a 4/10.  Knees feel like a burning and on fire.  Wakes him up from sleep at times. Denies swelling or mechanical symptoms. No prior treatment for his knees.  He did suffer a calcaneus fracture at that time.    History of present illness:  He has been doing physical therapy now for about 6 weeks.  Seems to be helping.  He is walking without any assistive device or braces.  Pain is a 3/10.      Review of Systems:    Constitution: Negative for chills, fever, and sweats.  Negative for unexplained weight loss.    HENT:  Negative for headaches and blurry vision.    Cardiovascular:Negative for chest pain or irregular heart beat. Negative for hypertension.    Respiratory:  Negative for cough and shortness of breath.    Gastrointestinal: Negative for abdominal pain, heartburn, melena, nausea, and vomitting.    Genitourinary:  Negative bladder incontinence and dysuria.    Musculoskeletal:  See HPI    Neurological: Negative for numbness.    Psychiatric/Behavioral: Negative for depression.  The patient is not nervous/anxious.      Endocrine: Negative for polyuria    Hematologic/Lymphatic: Negative for bleeding problem.  Does not bruise/bleed easily.    Skin: Negative for poor would  healing and rash      Physical Examination:    Vital Signs:  There were no vitals filed for this visit.    Body mass index is 35.67 kg/m².    This a well-developed, well nourished patient in no acute distress.  They are alert and oriented and cooperative to examination.  Pt. walks without an antalgic gait.      Examination of bilateral knees shows no rashes or erythema. There are no masses ecchymosis or effusion. Patient has full range of motion from 0-130°. Patient is nontender to palpation over lateral joint line and nontender to palpation over the medial joint line. Patient has a - Lachman exam, - anterior drawer exam, and - posterior drawer exam. - Apoorva's exam. Knee is stable to varus and valgus stress. 5 out of 5 motor strength. Palpable distal pulses. Intact light touch sensation. Negative Patellofemoral crepitus      X-rays:  X-rays of both knees are reviewed which show some mild medial joint space narrowing     Assessment::  Bilateral knee sprain    Plan:  Continue the physical therapy.  I will see him back 1 last time in 2 months.  Will defer to Dr. Browning as to return of work.  Patient able to return to work without restrictions at any point from my standpoint.    This note was created using [employer] voice recognition software that occasionally misinterpreted phrases or words.    Consult note is delivered via Epic messaging service.

## 2019-10-07 ENCOUNTER — CLINICAL SUPPORT (OUTPATIENT)
Dept: REHABILITATION | Facility: HOSPITAL | Age: 29
End: 2019-10-07
Payer: OTHER MISCELLANEOUS

## 2019-10-07 DIAGNOSIS — M25.562 CHRONIC PAIN OF BOTH KNEES: ICD-10-CM

## 2019-10-07 DIAGNOSIS — M25.561 CHRONIC PAIN OF BOTH KNEES: ICD-10-CM

## 2019-10-07 DIAGNOSIS — R29.898 WEAKNESS OF BOTH LOWER EXTREMITIES: ICD-10-CM

## 2019-10-07 DIAGNOSIS — G89.29 CHRONIC PAIN OF BOTH KNEES: ICD-10-CM

## 2019-10-07 PROCEDURE — 97110 THERAPEUTIC EXERCISES: CPT | Mod: PO

## 2019-10-14 DIAGNOSIS — S92.034D CLOSED NONDISPLACED AVULSION FRACTURE OF TUBEROSITY OF RIGHT CALCANEUS WITH ROUTINE HEALING, SUBSEQUENT ENCOUNTER: Primary | ICD-10-CM

## 2019-10-14 DIAGNOSIS — S92.046A: ICD-10-CM

## 2019-10-17 ENCOUNTER — OFFICE VISIT (OUTPATIENT)
Dept: ORTHOPEDICS | Facility: CLINIC | Age: 29
End: 2019-10-17
Payer: OTHER MISCELLANEOUS

## 2019-10-17 ENCOUNTER — HOSPITAL ENCOUNTER (OUTPATIENT)
Dept: RADIOLOGY | Facility: HOSPITAL | Age: 29
Discharge: HOME | End: 2019-10-17
Attending: ORTHOPAEDIC SURGERY
Payer: OTHER MISCELLANEOUS

## 2019-10-17 VITALS
HEIGHT: 72 IN | HEART RATE: 73 BPM | WEIGHT: 263 LBS | DIASTOLIC BLOOD PRESSURE: 80 MMHG | BODY MASS INDEX: 35.62 KG/M2 | SYSTOLIC BLOOD PRESSURE: 129 MMHG

## 2019-10-17 DIAGNOSIS — S92.046A: ICD-10-CM

## 2019-10-17 DIAGNOSIS — S92.034D CLOSED NONDISPLACED AVULSION FRACTURE OF TUBEROSITY OF RIGHT CALCANEUS WITH ROUTINE HEALING, SUBSEQUENT ENCOUNTER: ICD-10-CM

## 2019-10-17 DIAGNOSIS — S92.034D CLOSED NONDISPLACED AVULSION FRACTURE OF TUBEROSITY OF RIGHT CALCANEUS WITH ROUTINE HEALING, SUBSEQUENT ENCOUNTER: Primary | ICD-10-CM

## 2019-10-17 PROCEDURE — 99214 PR OFFICE/OUTPT VISIT, EST, LEVL IV, 30-39 MIN: ICD-10-PCS | Mod: S$GLB,,, | Performed by: ORTHOPAEDIC SURGERY

## 2019-10-17 PROCEDURE — 99214 OFFICE O/P EST MOD 30 MIN: CPT | Mod: S$GLB,,, | Performed by: ORTHOPAEDIC SURGERY

## 2019-10-17 PROCEDURE — 99999 PR PBB SHADOW E&M-EST. PATIENT-LVL III: CPT | Mod: PBBFAC,,, | Performed by: ORTHOPAEDIC SURGERY

## 2019-10-17 PROCEDURE — 73650 XR CALCANEUS BILATERAL: ICD-10-PCS | Mod: 26,50,, | Performed by: RADIOLOGY

## 2019-10-17 PROCEDURE — 99999 PR PBB SHADOW E&M-EST. PATIENT-LVL III: ICD-10-PCS | Mod: PBBFAC,,, | Performed by: ORTHOPAEDIC SURGERY

## 2019-10-17 PROCEDURE — 73650 X-RAY EXAM OF HEEL: CPT | Mod: 50,TC,PO

## 2019-10-17 PROCEDURE — 73650 X-RAY EXAM OF HEEL: CPT | Mod: 26,50,, | Performed by: RADIOLOGY

## 2019-10-17 NOTE — PROGRESS NOTES
HPI: Nabil Cristobal is a  29 y.o. male who is here for f/u on his bilateral calcaneus fractures. 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  He rates his pain as 0/10 today.    He has done well with PT.     PAST MEDICAL/SURGICAL/FAMILY/SOCIAL/ HISTORY: REVIEWED    ALLERGIES/MEDICATIONS: REVIEWED       PHYSICAL EXAM:  Vitals:    10/17/19 1042   BP: 129/80   Pulse: 73     Ht Readings from Last 1 Encounters:   10/17/19 6' (1.829 m)     Wt Readings from Last 1 Encounters:   10/17/19 119.3 kg (263 lb)       GENERAL: Well developed, well nourished, no acute distress. Obese.  GAIT: walks without a limp.     Bilateral lower extremities:  2+ dorsalis pedis pulse.  Capillary refill < 3 seconds. Full range of motion tibiotalar and subtalar joints. 5/5 strength.  Nol swelling both feet. No lymphadenopathy, no masses or tumors palpated.  no tenderness to palpation at the posterior aspect of the calcaneus bilaterally.     XRAYS:   3 views of bilateral feet obtained and reviewed today reveal non-displaced fractures of the posterior tuberosities of the calcaneus. Which appear to be healed.       ASSESSMENT:    Bilateral calcaneus fractures       PLAN:   Return to work, Return to full activities, no restrictions. I am discharging him from my care for this problem.

## 2019-10-31 ENCOUNTER — DOCUMENTATION ONLY (OUTPATIENT)
Dept: REHABILITATION | Facility: HOSPITAL | Age: 29
End: 2019-10-31

## 2019-10-31 PROBLEM — M25.561 CHRONIC PAIN OF BOTH KNEES: Status: RESOLVED | Noted: 2019-09-19 | Resolved: 2019-10-31

## 2019-10-31 PROBLEM — M25.562 CHRONIC PAIN OF BOTH KNEES: Status: RESOLVED | Noted: 2019-09-19 | Resolved: 2019-10-31

## 2019-10-31 PROBLEM — R29.898 WEAKNESS OF BOTH LOWER EXTREMITIES: Status: RESOLVED | Noted: 2019-09-19 | Resolved: 2019-10-31

## 2019-10-31 PROBLEM — G89.29 CHRONIC PAIN OF BOTH KNEES: Status: RESOLVED | Noted: 2019-09-19 | Resolved: 2019-10-31

## 2019-10-31 NOTE — PROGRESS NOTES
Outpatient Therapy Discharge Summary     Name: Nabil Cristobal  Clinic Number: [id number]    Therapy Diagnosis: Weakness of both lower extremities; chronic pain of both knees  Physician: Stanley Browning MD    Physician Orders: PT Eval and Treat  Medical Diagnosis from Referral: Closed nondisplaced avulsion fracture of tuberosity of right calcaneus with routine healing, subsequent encounter  Evaluation Date: 9/19/2019    Date of Last visit: 10/7/2019  Total Visits Received: 5  Cancelled Visits: 3  No Show Visits: 1    Assessment      This pt was seen in the clinic for 5 visits during EOC. The pt achieved majority of therapy goals and was asymptomatic at time of last session. Pt w/ multiple cancellations since last visit and has failed to return to clinic since.    Goals:  Short-Term Goals: 2 weeks  1) The patient will be independent and compliant with initial home exercise program as prescribed by physical therapist. MET 10/2/2019  2) The patient will report ability to ambulate 0.5 miles w/o any AD and pain <5/10 at worst to demonstrate improved tolerance to community ambulation. MET 10/2/2019  3) The patient will complete 15 SL calf raises on each LE to demonstrate improved strength and stability of foot/ankle complex. MET 10/7/2019     Long-Term Goals: 4 weeks  1) Pt to achieve <30% limitation as measured by the FOTO to demonstrate decreased disability. NOT MET  2) The patient will report ability to ambulate 1 mile w/o any AD and pain <5/10 at worst to demonstrate improved tolerance to community ambulation. MET 10/7/2019  3) The patient will complete 20 SL calf raises on each LE to demonstrate improved strength and stability of foot/ankle complex. NOT MET    Discharge reason: Patient has not attended therapy since 10/7/2019    Plan     This patient is discharged from Physical Therapy    Clayton Delgado PT, DPT  10/31/2019

## 2019-11-25 ENCOUNTER — OFFICE VISIT (OUTPATIENT)
Dept: ORTHOPEDICS | Facility: CLINIC | Age: 29
End: 2019-11-25
Payer: OTHER MISCELLANEOUS

## 2019-11-25 VITALS
WEIGHT: 263 LBS | SYSTOLIC BLOOD PRESSURE: 129 MMHG | HEART RATE: 55 BPM | RESPIRATION RATE: 13 BRPM | HEIGHT: 72 IN | DIASTOLIC BLOOD PRESSURE: 69 MMHG | BODY MASS INDEX: 35.62 KG/M2

## 2019-11-25 DIAGNOSIS — M76.32 IT BAND SYNDROME, LEFT: Primary | ICD-10-CM

## 2019-11-25 PROCEDURE — 99213 OFFICE O/P EST LOW 20 MIN: CPT | Mod: S$GLB,,, | Performed by: ORTHOPAEDIC SURGERY

## 2019-11-25 PROCEDURE — 99999 PR PBB SHADOW E&M-EST. PATIENT-LVL III: ICD-10-PCS | Mod: PBBFAC,,, | Performed by: ORTHOPAEDIC SURGERY

## 2019-11-25 PROCEDURE — 99999 PR PBB SHADOW E&M-EST. PATIENT-LVL III: CPT | Mod: PBBFAC,,, | Performed by: ORTHOPAEDIC SURGERY

## 2019-11-25 PROCEDURE — 99213 PR OFFICE/OUTPT VISIT, EST, LEVL III, 20-29 MIN: ICD-10-PCS | Mod: S$GLB,,, | Performed by: ORTHOPAEDIC SURGERY

## 2019-11-25 NOTE — PROGRESS NOTES
Past Medical History:   Diagnosis Date    ADHD (attention deficit hyperactivity disorder)     Anxiety     Depression     PTSD (post-traumatic stress disorder)     Rage        Past Surgical History:   Procedure Laterality Date    ADENOIDECTOMY      SINUS SURGERY      TONSILLECTOMY         Current Outpatient Medications   Medication Sig    ibuprofen (ADVIL,MOTRIN) 800 MG tablet Take 1 tablet (800 mg total) by mouth every 6 (six) hours as needed for Pain.    epinephrine (EPIPEN) 0.3 mg/0.3 mL AtIn Inject 0.3 mLs (0.3 mg total) into the muscle as needed.    oxyCODONE-acetaminophen (PERCOCET) 7.5-325 mg per tablet Take 1 tablet by mouth every 6 (six) hours as needed for Pain. (Patient not taking: Reported on 10/17/2019)     No current facility-administered medications for this visit.        Review of patient's allergies indicates:   Allergen Reactions    Pcn [penicillins] Anaphylaxis    Codeine        Family History   Problem Relation Age of Onset    Mental illness Mother     Mental illness Maternal Uncle        Social History     Socioeconomic History    Marital status: Single     Spouse name: Not on file    Number of children: Not on file    Years of education: Not on file    Highest education level: Not on file   Occupational History    Not on file   Social Needs    Financial resource strain: Not on file    Food insecurity:     Worry: Not on file     Inability: Not on file    Transportation needs:     Medical: Not on file     Non-medical: Not on file   Tobacco Use    Smoking status: Current Every Day Smoker     Packs/day: 1.00     Types: Cigarettes   Substance and Sexual Activity    Alcohol use: Yes    Drug use: No    Sexual activity: Not on file   Lifestyle    Physical activity:     Days per week: Not on file     Minutes per session: Not on file    Stress: Not on file   Relationships    Social connections:     Talks on phone: Not on file     Gets together: Not on file     Attends  [employer] service: Not on file     Active member of club or organization: Not on file     Attends meetings of clubs or organizations: Not on file     Relationship status: Not on file   Other Topics Concern    Not on file   Social History Narrative    Not on file       Chief Complaint:   Chief Complaint   Patient presents with    Left Knee - Injury, Pain    Post-op Evaluation     bilateral heels      Interval history:  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 Patient was involved in an accident on June 26th.  Patient fell off a ladder about 10 feet and landed hard onto his feet.  Since then he has had bilateral knee pain.  Patient had knee issues before but they have been worsened since then.  Has some paresthesias in both legs that existed prior to the injury.  Pain is a 4/10.  Knees feel like a burning and on fire.  Wakes him up from sleep at times. Denies swelling or mechanical symptoms. No prior treatment for his knees.  He did suffer a calcaneus fracture at that time.    History of present illness:  He is done with physical therapy.  He was released back to work by Dr. Browning.  Start to get some left lateral leg pain and swelling.  Pain is a 2/10.  Started to use a brace.      Review of Systems:    Musculoskeletal:  See HPI    Physical Examination:    Vital Signs:    Vitals:    11/25/19 0934   BP: 129/69   Pulse: (!) 55   Resp: 13       Body mass index is 35.67 kg/m².    This a well-developed, well nourished patient in no acute distress.  They are alert and oriented and cooperative to examination.  Pt. walks without an antalgic gait.      Examination of bilateral knees shows no rashes or erythema. There are no masses ecchymosis or effusion. Patient has full range of motion from 0-130°. Patient is nontender to palpation over lateral joint line and nontender to palpation over the medial joint line.  Knee is stable to varus and valgus stress. 5 out of 5 motor strength.  Palpable distal pulses. Intact light touch sensation. Negative Patellofemoral crepitus.  Some pain along the lateral IT band of the left knee.      X-rays:  X-rays of both knees are reviewed which show some mild medial joint space narrowing     Assessment::  Bilateral knee sprain  Left IT band    Plan:  I gave him some IT band exercises.  Talked about possibly doing an injection if the pain and swelling continues.  Continue the ibuprofen in the brace.  Follow up in 2 months.  Work restrictions per Dr. Browning.    This note was created using [employer] voice recognition software that occasionally misinterpreted phrases or words.    Consult note is delivered via Epic messaging service.

## 2020-01-27 ENCOUNTER — OFFICE VISIT (OUTPATIENT)
Dept: ORTHOPEDICS | Facility: CLINIC | Age: 30
End: 2020-01-27
Payer: OTHER MISCELLANEOUS

## 2020-01-27 VITALS
WEIGHT: 263 LBS | SYSTOLIC BLOOD PRESSURE: 145 MMHG | BODY MASS INDEX: 35.62 KG/M2 | HEIGHT: 72 IN | DIASTOLIC BLOOD PRESSURE: 80 MMHG | HEART RATE: 56 BPM | RESPIRATION RATE: 13 BRPM

## 2020-01-27 DIAGNOSIS — M25.562 LEFT KNEE PAIN, UNSPECIFIED CHRONICITY: Primary | ICD-10-CM

## 2020-01-27 DIAGNOSIS — S83.8X2D ACUTE LATERAL MENISCAL INJURY OF LEFT KNEE, SUBSEQUENT ENCOUNTER: Primary | ICD-10-CM

## 2020-01-27 PROCEDURE — 99999 PR PBB SHADOW E&M-EST. PATIENT-LVL III: ICD-10-PCS | Mod: PBBFAC,,, | Performed by: ORTHOPAEDIC SURGERY

## 2020-01-27 PROCEDURE — 99999 PR PBB SHADOW E&M-EST. PATIENT-LVL III: CPT | Mod: PBBFAC,,, | Performed by: ORTHOPAEDIC SURGERY

## 2020-01-27 PROCEDURE — 99213 OFFICE O/P EST LOW 20 MIN: CPT | Mod: S$GLB,,, | Performed by: ORTHOPAEDIC SURGERY

## 2020-01-27 PROCEDURE — 99213 PR OFFICE/OUTPT VISIT, EST, LEVL III, 20-29 MIN: ICD-10-PCS | Mod: S$GLB,,, | Performed by: ORTHOPAEDIC SURGERY

## 2020-01-27 NOTE — PROGRESS NOTES
Past Medical History:   Diagnosis Date    ADHD (attention deficit hyperactivity disorder)     Anxiety     Depression     PTSD (post-traumatic stress disorder)     Rage        Past Surgical History:   Procedure Laterality Date    ADENOIDECTOMY      SINUS SURGERY      TONSILLECTOMY         Current Outpatient Medications   Medication Sig    epinephrine (EPIPEN) 0.3 mg/0.3 mL AtIn Inject 0.3 mLs (0.3 mg total) into the muscle as needed.    ibuprofen (ADVIL,MOTRIN) 800 MG tablet Take 1 tablet (800 mg total) by mouth every 6 (six) hours as needed for Pain.    oxyCODONE-acetaminophen (PERCOCET) 7.5-325 mg per tablet Take 1 tablet by mouth every 6 (six) hours as needed for Pain. (Patient not taking: Reported on 10/17/2019)     No current facility-administered medications for this visit.        Review of patient's allergies indicates:   Allergen Reactions    Pcn [penicillins] Anaphylaxis    Codeine        Family History   Problem Relation Age of Onset    Mental illness Mother     Mental illness Maternal Uncle        Social History     Socioeconomic History    Marital status: Single     Spouse name: Not on file    Number of children: Not on file    Years of education: Not on file    Highest education level: Not on file   Occupational History    Not on file   Social Needs    Financial resource strain: Not on file    Food insecurity:     Worry: Not on file     Inability: Not on file    Transportation needs:     Medical: Not on file     Non-medical: Not on file   Tobacco Use    Smoking status: Current Every Day Smoker     Packs/day: 1.00     Types: Cigarettes   Substance and Sexual Activity    Alcohol use: Yes    Drug use: No    Sexual activity: Not on file   Lifestyle    Physical activity:     Days per week: Not on file     Minutes per session: Not on file    Stress: Not on file   Relationships    Social connections:     Talks on phone: Not on file     Gets together: Not on file     Attends  Christianity service: Not on file     Active member of club or organization: Not on file     Attends meetings of clubs or organizations: Not on file     Relationship status: Not on file   Other Topics Concern    Not on file   Social History Narrative    Not on file       Chief Complaint:   No chief complaint on file.    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 Patient was involved in an accident on June 26th.  Patient fell off a ladder about 10 feet and landed hard onto his feet.  Since then he has had bilateral knee pain.  Patient had knee issues before but they have been worsened since then.  Has some paresthesias in both legs that existed prior to the injury.  Pain is a 4/10.  Knees feel like a burning and on fire.  Wakes him up from sleep at times. Denies swelling or mechanical symptoms. No prior treatment for his knees.  He did suffer a calcaneus fracture at that time.    History of present illness:  He is done with physical therapy.  He was released back to work by Dr. Browning.  Continues to complain of left knee instability.  Knee feels like it wants to give out all the time.  Knee swells all the time. Never had an MRI after the incident.      Review of Systems:    Musculoskeletal:  See HPI    Physical Examination:    Vital Signs:    Vitals:    01/27/20 0935   BP: (!) 145/80   Pulse: (!) 56   Resp: 13       Body mass index is 35.67 kg/m².    This a well-developed, well nourished patient in no acute distress.  They are alert and oriented and cooperative to examination.  Pt. walks without an antalgic gait.      Examination of left knee shows no rashes or erythema. There are no masses ecchymosis or effusion. Patient has full range of motion from 0-130°. Patient is moderately tender to palpation over lateral joint line and nontender to palpation over the medial joint line.  Knee is stable to varus and valgus stress. 5 out of 5 motor strength. Palpable  distal pulses. Intact light touch sensation. Negative Patellofemoral crepitus.  Some pain along the lateral IT band of the left knee.      X-rays:  X-rays of both knees are reviewed which show some mild medial joint space narrowing     Assessment::  Bilateral knee sprain  Possible left lateral meniscal tear    Plan:  I recommended an MRI of his left knee.  He has had more complaints of his left knee buckling and swelling that he had previously.  He is back to doing a little more and this seems to be aggravating his knee.  Follow-up after the MRI is completed.    This note was created using [email or web address] [id number] voice recognition software that occasionally misinterpreted phrases or words.    Consult note is delivered via Epic messaging service.

## 2020-01-28 ENCOUNTER — TELEPHONE (OUTPATIENT)
Dept: ORTHOPEDICS | Facility: CLINIC | Age: 30
End: 2020-01-28

## 2020-01-31 ENCOUNTER — HOSPITAL ENCOUNTER (OUTPATIENT)
Dept: RADIOLOGY | Facility: HOSPITAL | Age: 30
Discharge: HOME | End: 2020-01-31
Attending: ORTHOPAEDIC SURGERY
Payer: OTHER MISCELLANEOUS

## 2020-01-31 DIAGNOSIS — M25.562 LEFT KNEE PAIN, UNSPECIFIED CHRONICITY: ICD-10-CM

## 2020-01-31 PROCEDURE — 73721 MRI JNT OF LWR EXTRE W/O DYE: CPT | Mod: TC,LT

## 2020-01-31 PROCEDURE — 73721 MRI KNEE WITHOUT CONTRAST LEFT: ICD-10-PCS | Mod: 26,LT,, | Performed by: RADIOLOGY

## 2020-01-31 PROCEDURE — 73721 MRI JNT OF LWR EXTRE W/O DYE: CPT | Mod: 26,LT,, | Performed by: RADIOLOGY

## 2020-02-13 ENCOUNTER — OFFICE VISIT (OUTPATIENT)
Dept: ORTHOPEDICS | Facility: CLINIC | Age: 30
End: 2020-02-13
Payer: OTHER MISCELLANEOUS

## 2020-02-13 VITALS — BODY MASS INDEX: 35.62 KG/M2 | RESPIRATION RATE: 16 BRPM | HEIGHT: 72 IN | WEIGHT: 263 LBS

## 2020-02-13 DIAGNOSIS — M23.52 OLD COMPLETE ACL TEAR, LEFT: Primary | ICD-10-CM

## 2020-02-13 DIAGNOSIS — S83.219D BUCKET HANDLE TEAR OF MEDIAL MENISCUS OF KNEE AS CURRENT INJURY, SUBSEQUENT ENCOUNTER: ICD-10-CM

## 2020-02-13 PROCEDURE — 99213 PR OFFICE/OUTPT VISIT, EST, LEVL III, 20-29 MIN: ICD-10-PCS | Mod: S$GLB,,, | Performed by: ORTHOPAEDIC SURGERY

## 2020-02-13 PROCEDURE — 99999 PR PBB SHADOW E&M-EST. PATIENT-LVL III: ICD-10-PCS | Mod: PBBFAC,,, | Performed by: ORTHOPAEDIC SURGERY

## 2020-02-13 PROCEDURE — 99999 PR PBB SHADOW E&M-EST. PATIENT-LVL III: CPT | Mod: PBBFAC,,, | Performed by: ORTHOPAEDIC SURGERY

## 2020-02-13 PROCEDURE — 99213 OFFICE O/P EST LOW 20 MIN: CPT | Mod: S$GLB,,, | Performed by: ORTHOPAEDIC SURGERY

## 2020-02-13 NOTE — PROGRESS NOTES
Past Medical History:   Diagnosis Date    ADHD (attention deficit hyperactivity disorder)     Anxiety     Depression     PTSD (post-traumatic stress disorder)     Rage        Past Surgical History:   Procedure Laterality Date    ADENOIDECTOMY      SINUS SURGERY      TONSILLECTOMY         Current Outpatient Medications   Medication Sig    ibuprofen (ADVIL,MOTRIN) 800 MG tablet Take 1 tablet (800 mg total) by mouth every 6 (six) hours as needed for Pain.    oxyCODONE-acetaminophen (PERCOCET) 7.5-325 mg per tablet Take 1 tablet by mouth every 6 (six) hours as needed for Pain.    epinephrine (EPIPEN) 0.3 mg/0.3 mL AtIn Inject 0.3 mLs (0.3 mg total) into the muscle as needed.     No current facility-administered medications for this visit.        Review of patient's allergies indicates:   Allergen Reactions    Pcn [penicillins] Anaphylaxis    Codeine        Family History   Problem Relation Age of Onset    Mental illness Mother     Mental illness Maternal Uncle        Social History     Socioeconomic History    Marital status: Single     Spouse name: Not on file    Number of children: Not on file    Years of education: Not on file    Highest education level: Not on file   Occupational History    Not on file   Social Needs    Financial resource strain: Not on file    Food insecurity:     Worry: Not on file     Inability: Not on file    Transportation needs:     Medical: Not on file     Non-medical: Not on file   Tobacco Use    Smoking status: Current Every Day Smoker     Packs/day: 1.00     Types: Cigarettes   Substance and Sexual Activity    Alcohol use: Yes    Drug use: No    Sexual activity: Not on file   Lifestyle    Physical activity:     Days per week: Not on file     Minutes per session: Not on file    Stress: Not on file   Relationships    Social connections:     Talks on phone: Not on file     Gets together: Not on file     Attends Tenriism service: Not on file     Active member of  club or organization: Not on file     Attends meetings of clubs or organizations: Not on file     Relationship status: Not on file   Other Topics Concern    Not on file   Social History Narrative    Not on file       Chief Complaint:   Chief Complaint   Patient presents with    Knee Pain     left knee-mri results     Interval history:  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 Patient was involved in an accident on June 26th.  Patient fell off a ladder about 10 feet and landed hard onto his feet.  Since then he has had bilateral knee pain.  Patient had knee issues before but they have been worsened since then.  Has some paresthesias in both legs that existed prior to the injury.  Pain is a 4/10.  Knees feel like a burning and on fire.  Wakes him up from sleep at times. Denies swelling or mechanical symptoms. No prior treatment for his knees.  He did suffer a calcaneus fracture at that time.    History of present illness:   Continues to complain of left knee instability.  Knee feels like it wants to give out all the time.  Knee swells all the time.  MRI confirmed the reasons for the instability.  He has both a bucket-handle tear of his medial meniscus and a complete ACL tear. Also complaining of occasional buckling of the right knee and constant back pain.  All this has been present since the fall.      Review of Systems:    Musculoskeletal:  See HPI    Physical Examination:    Vital Signs:    Vitals:    02/13/20 1003   Resp: 16       Body mass index is 35.67 kg/m².    This a well-developed, well nourished patient in no acute distress.  They are alert and oriented and cooperative to examination.  Pt. walks without an antalgic gait.      Examination of left knee shows no rashes or erythema. There are no masses ecchymosis or effusion. Patient has full range of motion from 0-130°. Patient is mildly tender tender to palpation over lateral joint line and moderately tender to  palpation over the medial joint line.  Knee is stable to varus and valgus stress. 5 out of 5 motor strength. Palpable distal pulses. Intact light touch sensation. Negative Patellofemoral crepitus.  Some pain along the lateral IT band of the left knee.    Examination of the right knee shows no rashes or erythema. There are no masses ecchymosis or effusion. Patient has full range of motion from 0-130°. Patient is nontender to palpation over lateral joint line and moderately tender to palpation over the medial joint line. Patient has a - Lachman exam, - anterior drawer exam, and - posterior drawer exam. - Apoorva's exam. Knee is stable to varus and valgus stress. 5 out of 5 motor strength. Palpable distal pulses. Intact light touch sensation. Negative Patellofemoral crepitus        X-rays:  X-rays of both knees are reviewed which show some mild medial joint space narrowing    MRI of the left knee:Large bucket-handle tear of the medial meniscus.  Most of the meniscal tissue is in the intercondylar notch.  Complete tear of the anterior cruciate ligament.  Large joint effusion.  Edema of the central tibia underlying the intercondylar tibial eminences, likely related to the cruciate ligament injury.     Assessment:  Left ACL tear with large bucket-handle tear of the medial meniscus  Possible right ACL or medial meniscal tear as well  Possible lumbar spine injury    Plan:  I reviewed the MRI of his left knee.  He has had more complaints of his left knee buckling and swelling that he had previously.  Given the severity of the findings on his left knee MRI, I do think it is prudent to go ahead an MRI both his right knee and his lumbar spine.  Unfortunately, the bilateral calcaneus fractures got all the attention after the injury.  Follow-up after MRI is are completed.  Patient will need surgery for his left knee including medial meniscal repair versus meniscectomy and ACL reconstruction.      This note was created using [employer] voice recognition software that occasionally misinterpreted phrases or words.    Consult note is delivered via Epic messaging service.

## 2020-02-14 DIAGNOSIS — M25.561 RIGHT KNEE PAIN, UNSPECIFIED CHRONICITY: Primary | ICD-10-CM

## 2020-02-14 DIAGNOSIS — M54.50 ACUTE BILATERAL LOW BACK PAIN, UNSPECIFIED WHETHER SCIATICA PRESENT: Primary | ICD-10-CM

## 2020-02-20 ENCOUNTER — HOSPITAL ENCOUNTER (OUTPATIENT)
Dept: RADIOLOGY | Facility: HOSPITAL | Age: 30
Discharge: HOME | End: 2020-02-20
Attending: ORTHOPAEDIC SURGERY
Payer: OTHER MISCELLANEOUS

## 2020-02-20 DIAGNOSIS — M25.561 RIGHT KNEE PAIN, UNSPECIFIED CHRONICITY: ICD-10-CM

## 2020-02-20 DIAGNOSIS — M54.50 ACUTE BILATERAL LOW BACK PAIN, UNSPECIFIED WHETHER SCIATICA PRESENT: ICD-10-CM

## 2020-02-20 PROCEDURE — 72148 MRI LUMBAR SPINE W/O DYE: CPT | Mod: TC

## 2020-02-20 PROCEDURE — 73721 MRI KNEE WITHOUT CONTRAST RIGHT: ICD-10-PCS | Mod: 26,RT,, | Performed by: RADIOLOGY

## 2020-02-20 PROCEDURE — 72148 MRI LUMBAR SPINE W/O DYE: CPT | Mod: 26,,, | Performed by: RADIOLOGY

## 2020-02-20 PROCEDURE — 73721 MRI JNT OF LWR EXTRE W/O DYE: CPT | Mod: TC,RT

## 2020-02-20 PROCEDURE — 73721 MRI JNT OF LWR EXTRE W/O DYE: CPT | Mod: 26,RT,, | Performed by: RADIOLOGY

## 2020-02-20 PROCEDURE — 72148 MRI LUMBAR SPINE WITHOUT CONTRAST: ICD-10-PCS | Mod: 26,,, | Performed by: RADIOLOGY

## 2020-02-27 ENCOUNTER — OFFICE VISIT (OUTPATIENT)
Dept: ORTHOPEDICS | Facility: CLINIC | Age: 30
End: 2020-02-27
Payer: OTHER MISCELLANEOUS

## 2020-02-27 VITALS — RESPIRATION RATE: 16 BRPM | WEIGHT: 263 LBS | HEIGHT: 72 IN | BODY MASS INDEX: 35.62 KG/M2

## 2020-02-27 DIAGNOSIS — S83.282S ACUTE LATERAL MENISCAL TEAR, LEFT, SEQUELA: ICD-10-CM

## 2020-02-27 DIAGNOSIS — S83.242D ACUTE MEDIAL MENISCUS TEAR OF LEFT KNEE, SUBSEQUENT ENCOUNTER: Primary | ICD-10-CM

## 2020-02-27 PROCEDURE — 99999 PR PBB SHADOW E&M-EST. PATIENT-LVL III: CPT | Mod: PBBFAC,,, | Performed by: ORTHOPAEDIC SURGERY

## 2020-02-27 PROCEDURE — 99999 PR PBB SHADOW E&M-EST. PATIENT-LVL III: ICD-10-PCS | Mod: PBBFAC,,, | Performed by: ORTHOPAEDIC SURGERY

## 2020-02-27 PROCEDURE — 99214 PR OFFICE/OUTPT VISIT, EST, LEVL IV, 30-39 MIN: ICD-10-PCS | Mod: S$GLB,,, | Performed by: ORTHOPAEDIC SURGERY

## 2020-02-27 PROCEDURE — 99214 OFFICE O/P EST MOD 30 MIN: CPT | Mod: S$GLB,,, | Performed by: ORTHOPAEDIC SURGERY

## 2020-02-28 PROBLEM — S83.242A ACUTE MEDIAL MENISCUS TEAR OF LEFT KNEE: Status: ACTIVE | Noted: 2020-02-28

## 2020-02-28 PROBLEM — S83.282S: Status: ACTIVE | Noted: 2020-02-28

## 2020-02-28 RX ORDER — CLINDAMYCIN PHOSPHATE 900 MG/50ML
900 INJECTION, SOLUTION INTRAVENOUS
Status: CANCELLED | OUTPATIENT
Start: 2020-02-28

## 2020-02-28 NOTE — PROGRESS NOTES
Past Medical History:   Diagnosis Date    ADHD (attention deficit hyperactivity disorder)     Anxiety     Depression     PTSD (post-traumatic stress disorder)     Rage        Past Surgical History:   Procedure Laterality Date    ADENOIDECTOMY      SINUS SURGERY      TONSILLECTOMY         Current Outpatient Medications   Medication Sig    epinephrine (EPIPEN) 0.3 mg/0.3 mL AtIn Inject 0.3 mLs (0.3 mg total) into the muscle as needed.    ibuprofen (ADVIL,MOTRIN) 800 MG tablet Take 1 tablet (800 mg total) by mouth every 6 (six) hours as needed for Pain.    oxyCODONE-acetaminophen (PERCOCET) 7.5-325 mg per tablet Take 1 tablet by mouth every 6 (six) hours as needed for Pain.     No current facility-administered medications for this visit.        Review of patient's allergies indicates:   Allergen Reactions    Pcn [penicillins] Anaphylaxis    Codeine        Family History   Problem Relation Age of Onset    Mental illness Mother     Mental illness Maternal Uncle        Social History     Socioeconomic History    Marital status: Single     Spouse name: Not on file    Number of children: Not on file    Years of education: Not on file    Highest education level: Not on file   Occupational History    Not on file   Social Needs    Financial resource strain: Not on file    Food insecurity:     Worry: Not on file     Inability: Not on file    Transportation needs:     Medical: Not on file     Non-medical: Not on file   Tobacco Use    Smoking status: Current Every Day Smoker     Packs/day: 1.00     Types: Cigarettes   Substance and Sexual Activity    Alcohol use: Yes    Drug use: No    Sexual activity: Not on file   Lifestyle    Physical activity:     Days per week: Not on file     Minutes per session: Not on file    Stress: Not on file   Relationships    Social connections:     Talks on phone: Not on file     Gets together: Not on file     Attends Sikhism service: Not on file     Active member of  club or organization: Not on file     Attends meetings of clubs or organizations: Not on file     Relationship status: Not on file   Other Topics Concern    Not on file   Social History Narrative    Not on file       Chief Complaint:   Chief Complaint   Patient presents with    Right Knee - Pain     Interval history:  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 Patient was involved in an accident on June 26th.  Patient fell off a ladder about 10 feet and landed hard onto his feet.  Since then he has had bilateral knee pain.  Patient had knee issues before but they have been worsened since then.  Has some paresthesias in both legs that existed prior to the injury.  Pain is a 4/10.  Knees feel like a burning and on fire.  Wakes him up from sleep at times. Denies swelling or mechanical symptoms. No prior treatment for his knees.  He did suffer a calcaneus fracture at that time.    History of present illness:   Continues to complain of left knee instability.  Knee feels like it wants to give out all the time.  Knee swells all the time.  MRI confirmed the reasons for the instability.  He has both a bucket-handle tear of his medial meniscus and a complete ACL tear. Also complaining of occasional buckling of the right knee and constant back pain.  All this has been present since the fall.      Review of Systems:    Musculoskeletal:  See HPI    Physical Examination:    Vital Signs:    Vitals:    02/27/20 1451   Resp: 16       Body mass index is 35.67 kg/m².    This a well-developed, well nourished patient in no acute distress.  They are alert and oriented and cooperative to examination.  Pt. walks without an antalgic gait.      Examination of left knee shows no rashes or erythema. There are no masses ecchymosis or effusion. Patient has full range of motion from 0-130°. Patient is mildly tender tender to palpation over lateral joint line and moderately tender to palpation over the  medial joint line.  Knee is stable to varus and valgus stress. 5 out of 5 motor strength. Palpable distal pulses. Intact light touch sensation. Negative Patellofemoral crepitus.  Some pain along the lateral IT band of the left knee.    Examination of the right knee shows no rashes or erythema. There are no masses ecchymosis or effusion. Patient has full range of motion from 0-130°. Patient is nontender to palpation over lateral joint line and moderately tender to palpation over the medial joint line. Patient has a - Lachman exam, - anterior drawer exam, and - posterior drawer exam. - Apoorva's exam. Knee is stable to varus and valgus stress. 5 out of 5 motor strength. Palpable distal pulses. Intact light touch sensation. Negative Patellofemoral crepitus    Heat: regular rate  Lungs: no audible wheezes  Abdomen: soft      X-rays:  X-rays of both knees are reviewed which show some mild medial joint space narrowing    MRI of the left knee:Large bucket-handle tear of the medial meniscus.  Most of the meniscal tissue is in the intercondylar notch.  Complete tear of the anterior cruciate ligament.  Large joint effusion.  Edema of the central tibia underlying the intercondylar tibial eminences, likely related to the cruciate ligament injury.    MRI of the right knee: Bone contusion of the lateral tibial plateau and undersurface tear of the posterior horn medial meniscus.    MRI of the lumbar spine: 1. Multilevel degenerative changes of the lumbar spine, as detailed above, which contribute to mild spinal canal stenosis at L3-4 and mild-to-moderate neural foraminal stenosis at L3-4 through L5-S1, most pronounced on the right at L5-S1.     Assessment:  Left ACL tear with large bucket-handle tear of the medial meniscus  Small right medial meniscal tear   Possible lumbar spine injury    Plan:  I reviewed the MRI of his left knee.  He has had more complaints of his left knee buckling and swelling that he had previously.  Given  the severity of the findings on his left knee MRI, I do think it is prudent to go ahead an MRI both his right knee and his lumbar spine.  Unfortunately, the bilateral calcaneus fractures got all the attention after the injury.  Plan is for left medial meniscal repair versus menisectomy. Risks, benefits, and alternatives to the procedure were explained to the patient including but not limited to damage to nerves, arteries, blood vessels, infection, DVT, PE as well as general anesthetic complications including seizure, stroke, heart attack and even death. The patient understood these risks and wished to proceed and signed the informed consent.         This note was created using [employer] voice recognition software that occasionally misinterpreted phrases or words.    Consult note is delivered via Epic messaging service.

## 2020-03-05 ENCOUNTER — HOSPITAL ENCOUNTER (OUTPATIENT)
Dept: PREADMISSION TESTING | Facility: HOSPITAL | Age: 30
Discharge: HOME | End: 2020-03-05
Attending: INTERNAL MEDICINE
Payer: OTHER MISCELLANEOUS

## 2020-03-05 VITALS — HEIGHT: 72 IN | BODY MASS INDEX: 35.62 KG/M2 | WEIGHT: 263 LBS

## 2020-03-05 DIAGNOSIS — S83.242D ACUTE MEDIAL MENISCUS TEAR OF LEFT KNEE, SUBSEQUENT ENCOUNTER: ICD-10-CM

## 2020-03-05 LAB
ANION GAP SERPL CALC-SCNC: 9 MMOL/L (ref 8–16)
BASOPHILS # BLD AUTO: 0.07 K/UL (ref 0–0.2)
BASOPHILS NFR BLD: 0.9 % (ref 0–1.9)
BUN SERPL-MCNC: 14 MG/DL (ref 6–20)
CALCIUM SERPL-MCNC: 9.7 MG/DL (ref 8.7–10.5)
CHLORIDE SERPL-SCNC: 102 MMOL/L (ref 95–110)
CO2 SERPL-SCNC: 28 MMOL/L (ref 23–29)
CREAT SERPL-MCNC: 1.1 MG/DL (ref 0.5–1.4)
DIFFERENTIAL METHOD: ABNORMAL
EOSINOPHIL # BLD AUTO: 0.2 K/UL (ref 0–0.5)
EOSINOPHIL NFR BLD: 2.3 % (ref 0–8)
ERYTHROCYTE [DISTWIDTH] IN BLOOD BY AUTOMATED COUNT: 13.2 % (ref 11.5–14.5)
EST. GFR  (AFRICAN AMERICAN): >60 ML/MIN/1.73 M^2
EST. GFR  (NON AFRICAN AMERICAN): >60 ML/MIN/1.73 M^2
GLUCOSE SERPL-MCNC: 82 MG/DL (ref 70–110)
HCT VFR BLD AUTO: 44.8 % (ref 40–54)
HGB BLD-MCNC: 14.7 G/DL (ref 14–18)
IMM GRANULOCYTES # BLD AUTO: 0.05 K/UL (ref 0–0.04)
LYMPHOCYTES # BLD AUTO: 2.1 K/UL (ref 1–4.8)
LYMPHOCYTES NFR BLD: 26.3 % (ref 18–48)
MCH RBC QN AUTO: 30.8 PG (ref 27–31)
MCHC RBC AUTO-ENTMCNC: 32.8 G/DL (ref 32–36)
MCV RBC AUTO: 94 FL (ref 82–98)
MONOCYTES # BLD AUTO: 0.8 K/UL (ref 0.3–1)
MONOCYTES NFR BLD: 10.5 % (ref 4–15)
NEUTROPHILS # BLD AUTO: 4.7 K/UL (ref 1.8–7.7)
NEUTROPHILS NFR BLD: 59.4 % (ref 38–73)
NRBC BLD-RTO: 0 /100 WBC
PLATELET # BLD AUTO: 317 K/UL (ref 150–350)
PMV BLD AUTO: 10.5 FL (ref 9.2–12.9)
POTASSIUM SERPL-SCNC: 4.5 MMOL/L (ref 3.5–5.1)
RBC # BLD AUTO: 4.78 M/UL (ref 4.6–6.2)
SODIUM SERPL-SCNC: 139 MMOL/L (ref 136–145)
WBC # BLD AUTO: 7.83 K/UL (ref 3.9–12.7)

## 2020-03-05 PROCEDURE — 99900103 DSU ONLY-NO CHARGE-INITIAL HR (STAT)

## 2020-03-05 PROCEDURE — 36415 COLL VENOUS BLD VENIPUNCTURE: CPT

## 2020-03-05 PROCEDURE — 85025 COMPLETE CBC W/AUTO DIFF WBC: CPT

## 2020-03-05 PROCEDURE — 99900104 DSU ONLY-NO CHARGE-EA ADD'L HR (STAT)

## 2020-03-05 PROCEDURE — 80048 BASIC METABOLIC PNL TOTAL CA: CPT

## 2020-03-05 NOTE — DISCHARGE INSTRUCTIONS
To confirm, Your doctor has instructed you that surgery is scheduled for: 3/10/2020    Please report to [facility] the morning of surgery. You must check-in and receive a wristband before going to your procedure.    Pre-Op will call the afternoon prior to surgery between 1:00 and 6:00 PM with the final arrival time.  Phone number: [phone]    PLEASE NOTE:  The surgery schedule has many variables which may affect the time of your surgery case.  Family members should be available if your surgery time changes.  Plan to be here the day of your procedure between 4-6 hours.    MEDICATIONS:  TAKE ONLY THESE MEDICATIONS WITH A SMALL SIP OF WATER THE MORNING OF YOUR PROCEDURE:  OXYCODONE IF NEEDED      DO NOT TAKE THESE MEDICATIONS 5-7 DAYS PRIOR to your procedure or per your surgeon's request: ASPIRIN, ALEVE, ADVIL, IBUPROFEN, FISH OIL VITAMIN E, HERBALS  (May take Tylenol)    ONLY if you are prescribed any types of blood thinners such as:  Aspirin, Coumadin, Plavix, Pradaxa, Xarelto, Aggrenox, Effient, Eliquis, Savasya, Brilinta, or any other, ask your surgeon whether you should stop taking them and how long before surgery you should stop.  You may also need to verify with the prescribing physician if it is ok to stop your medication.      INSTRUCTIONS IMPORTANT!!  · Do not eat or drink anything between midnight and the time of your procedure- this includes gum, mints, and candy.  · Do not smoke or drink alcoholic beverages 24 hours prior to your procedure.  · Shower the night before AND the morning of your procedure with a Chlorhexidine wash such as Hibiclens or Dial antibacterial soap from the neck down.  Do not get it on your face or in your eyes.  You may use your own shampoo and face wash. This helps your skin to be as bacteria free as possible.    · If you wear contact lenses, dentures, hearing aids or glasses, bring a container to put them in during surgery and give to a  family member for safe keeping.  Please leave all jewelry, piercing's and valuables at home.   · DO NOT remove hair from the surgery site.  Do not shave the incision site unless you are given specific instructions to do so.    · ONLY if you have been diagnosed with sleep apnea please bring your C-PAP machine.  · ONLY if you wear home oxygen please bring your portable oxygen tank the day of your procedure.  · ONLY if you have a history of OPEN HEART SURGERY you will need a clearance from your Cardiologist per Anesthesia.      · ONLY for patients requiring bowel prep, written instructions will be given by your doctor's office.  · ONLY if you have a neuro stimulator, please bring the controller with you the morning of surgery  · ONLY if a type and screen test is needed before surgery, please return: N/A  · If your doctor has scheduled you for an overnight stay, bring a small overnight bag with any personal items you need.  · Make arrangements in advance for transportation home by a responsible adult.  It is not safe to drive a vehicle during the 24 hours after anesthesia.      · Visiting hours are 10:00AM to 8:30PM.  For the safety of all patients, visitors under the age of 12 are not allowed above the first floor of the hospital.    · All [facility] facilities and properties are tobacco free.  Smoking is NOT allowed.       If you have any questions about these instructions, call Pre-Op Admit  Nursing at [phone] or the Pre-Op Day Surgery Unit at [phone].

## 2020-05-18 ENCOUNTER — TELEPHONE (OUTPATIENT)
Dept: ORTHOPEDICS | Facility: CLINIC | Age: 30
End: 2020-05-18

## 2020-05-18 NOTE — TELEPHONE ENCOUNTER
Called and spoke with patient and he was wanting to know if we have received any information regarding the approval for his surgery. Advised that we have not received anything at this time. Advised him to contact his  and see if they have anymore information. Advised that we have appealed the denial. He verbalized understanding.

## 2020-05-18 NOTE — TELEPHONE ENCOUNTER
----- Message from Jefry Lan sent at 5/18/2020 12:18 PM CDT -----  Contact: Patient  Type: Needs Medical Advice    Who Called:  Patient  Best Call Back Number: [phone]; [phone] (patient's aunt)  Additional Information: Patient would like to discuss knee surgery. Please call to advise. Thanks!

## 2020-06-12 DIAGNOSIS — S83.242S ACUTE MEDIAL MENISCAL TEAR, LEFT, SEQUELA: ICD-10-CM

## 2020-06-12 DIAGNOSIS — S83.242D ACUTE MEDIAL MENISCUS TEAR OF LEFT KNEE, SUBSEQUENT ENCOUNTER: Primary | ICD-10-CM

## 2020-06-12 RX ORDER — CLINDAMYCIN PHOSPHATE 900 MG/50ML
900 INJECTION, SOLUTION INTRAVENOUS
Status: CANCELLED | OUTPATIENT
Start: 2020-06-12

## 2020-07-10 DIAGNOSIS — Z01.818 PRE-OP TESTING: ICD-10-CM

## 2020-07-14 ENCOUNTER — LAB VISIT (OUTPATIENT)
Dept: PRIMARY CARE CLINIC | Facility: CLINIC | Age: 30
End: 2020-07-14
Payer: OTHER GOVERNMENT

## 2020-07-14 DIAGNOSIS — Z01.818 PRE-OP TESTING: ICD-10-CM

## 2020-07-14 PROCEDURE — U0003 INFECTIOUS AGENT DETECTION BY NUCLEIC ACID (DNA OR RNA); SEVERE ACUTE RESPIRATORY SYNDROME CORONAVIRUS 2 (SARS-COV-2) (CORONAVIRUS DISEASE [COVID-19]), AMPLIFIED PROBE TECHNIQUE, MAKING USE OF HIGH THROUGHPUT TECHNOLOGIES AS DESCRIBED BY CMS-2020-01-R: HCPCS

## 2020-07-15 LAB — SARS-COV-2 RNA RESP QL NAA+PROBE: NOT DETECTED

## 2020-07-15 NOTE — PRE-PROCEDURE INSTRUCTIONS
Pre-op phone call made to pt.amd Aunt.   All medications reviewed and  pre-procedure  instructions given to pt.and Aunt. Aunt  verbalized understanding.  Covid test done 7/14/20

## 2020-07-16 ENCOUNTER — ANESTHESIA EVENT (OUTPATIENT)
Dept: SURGERY | Facility: HOSPITAL | Age: 30
End: 2020-07-16
Payer: OTHER MISCELLANEOUS

## 2020-07-17 ENCOUNTER — HOSPITAL ENCOUNTER (OUTPATIENT)
Facility: HOSPITAL | Age: 30
Discharge: HOME | End: 2020-07-17
Attending: ORTHOPAEDIC SURGERY | Admitting: ORTHOPAEDIC SURGERY
Payer: OTHER MISCELLANEOUS

## 2020-07-17 ENCOUNTER — ANESTHESIA (OUTPATIENT)
Dept: SURGERY | Facility: HOSPITAL | Age: 30
End: 2020-07-17
Payer: OTHER MISCELLANEOUS

## 2020-07-17 VITALS
WEIGHT: 263 LBS | DIASTOLIC BLOOD PRESSURE: 81 MMHG | RESPIRATION RATE: 16 BRPM | SYSTOLIC BLOOD PRESSURE: 158 MMHG | OXYGEN SATURATION: 98 % | HEART RATE: 55 BPM | BODY MASS INDEX: 35.62 KG/M2 | HEIGHT: 72 IN | TEMPERATURE: 98 F

## 2020-07-17 DIAGNOSIS — S83.242S ACUTE MEDIAL MENISCAL TEAR, LEFT, SEQUELA: ICD-10-CM

## 2020-07-17 DIAGNOSIS — S83.242D ACUTE MEDIAL MENISCUS TEAR OF LEFT KNEE, SUBSEQUENT ENCOUNTER: ICD-10-CM

## 2020-07-17 PROCEDURE — D9220A PRA ANESTHESIA: ICD-10-PCS | Mod: ,,, | Performed by: ANESTHESIOLOGY

## 2020-07-17 PROCEDURE — 25000003 PHARM REV CODE 250: Performed by: NURSE ANESTHETIST, CERTIFIED REGISTERED

## 2020-07-17 PROCEDURE — 29881 ARTHRS KNE SRG MNISECTMY M/L: CPT | Mod: LT,,, | Performed by: ORTHOPAEDIC SURGERY

## 2020-07-17 PROCEDURE — D9220A PRA ANESTHESIA: Mod: ,,, | Performed by: ANESTHESIOLOGY

## 2020-07-17 PROCEDURE — 37000009 HC ANESTHESIA EA ADD 15 MINS: Performed by: ORTHOPAEDIC SURGERY

## 2020-07-17 PROCEDURE — 25000003 PHARM REV CODE 250: Performed by: ANESTHESIOLOGY

## 2020-07-17 PROCEDURE — 99900103 DSU ONLY-NO CHARGE-INITIAL HR (STAT): Performed by: ORTHOPAEDIC SURGERY

## 2020-07-17 PROCEDURE — 37000008 HC ANESTHESIA 1ST 15 MINUTES: Performed by: ORTHOPAEDIC SURGERY

## 2020-07-17 PROCEDURE — 29881 PR KNEE SCOPE SINGLE MENISECECTOMY: ICD-10-PCS | Mod: LT,,, | Performed by: ORTHOPAEDIC SURGERY

## 2020-07-17 PROCEDURE — 63600175 PHARM REV CODE 636 W HCPCS: Performed by: ANESTHESIOLOGY

## 2020-07-17 PROCEDURE — 27200651 HC AIRWAY, LMA: Performed by: ANESTHESIOLOGY

## 2020-07-17 PROCEDURE — 25000003 PHARM REV CODE 250: Performed by: ORTHOPAEDIC SURGERY

## 2020-07-17 PROCEDURE — 71000015 HC POSTOP RECOV 1ST HR: Performed by: ORTHOPAEDIC SURGERY

## 2020-07-17 PROCEDURE — 27201423 OPTIME MED/SURG SUP & DEVICES STERILE SUPPLY: Performed by: ORTHOPAEDIC SURGERY

## 2020-07-17 PROCEDURE — 71000039 HC RECOVERY, EACH ADD'L HOUR: Performed by: ORTHOPAEDIC SURGERY

## 2020-07-17 PROCEDURE — 36000710: Performed by: ORTHOPAEDIC SURGERY

## 2020-07-17 PROCEDURE — 63600175 PHARM REV CODE 636 W HCPCS: Performed by: NURSE ANESTHETIST, CERTIFIED REGISTERED

## 2020-07-17 PROCEDURE — 71000033 HC RECOVERY, INTIAL HOUR: Performed by: ORTHOPAEDIC SURGERY

## 2020-07-17 PROCEDURE — 99900104 DSU ONLY-NO CHARGE-EA ADD'L HR (STAT): Performed by: ORTHOPAEDIC SURGERY

## 2020-07-17 PROCEDURE — 36000711: Performed by: ORTHOPAEDIC SURGERY

## 2020-07-17 RX ORDER — FENTANYL CITRATE 50 UG/ML
25 INJECTION, SOLUTION INTRAMUSCULAR; INTRAVENOUS EVERY 5 MIN PRN
Status: DISCONTINUED | OUTPATIENT
Start: 2020-07-17 | End: 2020-07-17 | Stop reason: HOSPADM

## 2020-07-17 RX ORDER — ASPIRIN 81 MG/1
81 TABLET ORAL 2 TIMES DAILY
Refills: 0 | COMMUNITY
Start: 2020-07-17 | End: 2021-10-19

## 2020-07-17 RX ORDER — FENTANYL CITRATE 50 UG/ML
INJECTION, SOLUTION INTRAMUSCULAR; INTRAVENOUS
Status: DISCONTINUED | OUTPATIENT
Start: 2020-07-17 | End: 2020-07-17

## 2020-07-17 RX ORDER — PROPOFOL 10 MG/ML
VIAL (ML) INTRAVENOUS
Status: DISCONTINUED | OUTPATIENT
Start: 2020-07-17 | End: 2020-07-17

## 2020-07-17 RX ORDER — CLINDAMYCIN PHOSPHATE 900 MG/50ML
900 INJECTION, SOLUTION INTRAVENOUS
Status: COMPLETED | OUTPATIENT
Start: 2020-07-17 | End: 2020-07-17

## 2020-07-17 RX ORDER — LIDOCAINE HYDROCHLORIDE 10 MG/ML
0.5 INJECTION, SOLUTION EPIDURAL; INFILTRATION; INTRACAUDAL; PERINEURAL ONCE
Status: DISCONTINUED | OUTPATIENT
Start: 2020-07-17 | End: 2022-11-14

## 2020-07-17 RX ORDER — OXYCODONE AND ACETAMINOPHEN 7.5; 325 MG/1; MG/1
1 TABLET ORAL EVERY 6 HOURS PRN
Qty: 28 TABLET | Refills: 0 | Status: SHIPPED | OUTPATIENT
Start: 2020-07-17 | End: 2021-10-19

## 2020-07-17 RX ORDER — OXYCODONE HYDROCHLORIDE 5 MG/1
5 TABLET ORAL
Status: DISCONTINUED | OUTPATIENT
Start: 2020-07-17 | End: 2020-07-17 | Stop reason: HOSPADM

## 2020-07-17 RX ORDER — LIDOCAINE HCL/PF 100 MG/5ML
SYRINGE (ML) INTRAVENOUS
Status: DISCONTINUED | OUTPATIENT
Start: 2020-07-17 | End: 2020-07-17

## 2020-07-17 RX ORDER — DEXMEDETOMIDINE HYDROCHLORIDE 100 UG/ML
INJECTION, SOLUTION INTRAVENOUS
Status: DISCONTINUED | OUTPATIENT
Start: 2020-07-17 | End: 2020-07-17

## 2020-07-17 RX ORDER — ACETAMINOPHEN 10 MG/ML
INJECTION, SOLUTION INTRAVENOUS
Status: DISCONTINUED | OUTPATIENT
Start: 2020-07-17 | End: 2020-07-17

## 2020-07-17 RX ORDER — HYDROMORPHONE HYDROCHLORIDE 2 MG/ML
0.2 INJECTION, SOLUTION INTRAMUSCULAR; INTRAVENOUS; SUBCUTANEOUS EVERY 5 MIN PRN
Status: DISCONTINUED | OUTPATIENT
Start: 2020-07-17 | End: 2020-07-17 | Stop reason: HOSPADM

## 2020-07-17 RX ORDER — BUPIVACAINE HCL/EPINEPHRINE 0.25-.0005
VIAL (ML) INJECTION
Status: DISCONTINUED | OUTPATIENT
Start: 2020-07-17 | End: 2020-07-17 | Stop reason: HOSPADM

## 2020-07-17 RX ORDER — ONDANSETRON HYDROCHLORIDE 2 MG/ML
INJECTION, SOLUTION INTRAMUSCULAR; INTRAVENOUS
Status: DISCONTINUED | OUTPATIENT
Start: 2020-07-17 | End: 2020-07-17

## 2020-07-17 RX ORDER — MIDAZOLAM HYDROCHLORIDE 1 MG/ML
2 INJECTION, SOLUTION INTRAMUSCULAR; INTRAVENOUS
Status: COMPLETED | OUTPATIENT
Start: 2020-07-17 | End: 2020-07-17

## 2020-07-17 RX ORDER — SODIUM CHLORIDE, SODIUM LACTATE, POTASSIUM CHLORIDE, CALCIUM CHLORIDE 600; 310; 30; 20 MG/100ML; MG/100ML; MG/100ML; MG/100ML
INJECTION, SOLUTION INTRAVENOUS CONTINUOUS
Status: DISCONTINUED | OUTPATIENT
Start: 2020-07-17 | End: 2022-11-14

## 2020-07-17 RX ORDER — MIDAZOLAM HYDROCHLORIDE 1 MG/ML
INJECTION, SOLUTION INTRAMUSCULAR; INTRAVENOUS
Status: DISCONTINUED | OUTPATIENT
Start: 2020-07-17 | End: 2020-07-17

## 2020-07-17 RX ADMIN — MIDAZOLAM 2 MG: 1 INJECTION INTRAMUSCULAR; INTRAVENOUS at 11:07

## 2020-07-17 RX ADMIN — LIDOCAINE HYDROCHLORIDE 100 MG: 20 INJECTION, SOLUTION INTRAVENOUS at 12:07

## 2020-07-17 RX ADMIN — ONDANSETRON 4 MG: 2 INJECTION, SOLUTION INTRAMUSCULAR; INTRAVENOUS at 12:07

## 2020-07-17 RX ADMIN — OXYCODONE 5 MG: 5 TABLET ORAL at 01:07

## 2020-07-17 RX ADMIN — FENTANYL CITRATE 50 MCG: 50 INJECTION, SOLUTION INTRAMUSCULAR; INTRAVENOUS at 11:07

## 2020-07-17 RX ADMIN — CLINDAMYCIN PHOSPHATE 900 MG: 18 INJECTION, SOLUTION INTRAVENOUS at 12:07

## 2020-07-17 RX ADMIN — MIDAZOLAM HYDROCHLORIDE 2 MG: 1 INJECTION, SOLUTION INTRAMUSCULAR; INTRAVENOUS at 10:07

## 2020-07-17 RX ADMIN — FENTANYL CITRATE 50 MCG: 50 INJECTION, SOLUTION INTRAMUSCULAR; INTRAVENOUS at 12:07

## 2020-07-17 RX ADMIN — PROPOFOL 200 MG: 10 INJECTION, EMULSION INTRAVENOUS at 12:07

## 2020-07-17 RX ADMIN — ACETAMINOPHEN 1000 MG: 10 INJECTION, SOLUTION INTRAVENOUS at 12:07

## 2020-07-17 RX ADMIN — SODIUM CHLORIDE, SODIUM GLUCONATE, SODIUM ACETATE, POTASSIUM CHLORIDE, MAGNESIUM CHLORIDE, SODIUM PHOSPHATE, DIBASIC, AND POTASSIUM PHOSPHATE: .53; .5; .37; .037; .03; .012; .00082 INJECTION, SOLUTION INTRAVENOUS at 10:07

## 2020-07-17 RX ADMIN — DEXMEDETOMIDINE HYDROCHLORIDE 50 MCG: 100 INJECTION, SOLUTION INTRAVENOUS at 12:07

## 2020-07-17 NOTE — PLAN OF CARE
Discharge instructions given to pt and aunt, verbalized understanding and questions answered. Handouts provided. Belongings given back to pt. IV removed. Crutches given to pt- instructed how to use. Polar ice given to pt. Immobilizer on.  Discharge via wheelchair.

## 2020-07-17 NOTE — ANESTHESIA POSTPROCEDURE EVALUATION
Anesthesia Post Evaluation    Patient: Nabil Cristobal    Procedure(s) Performed: Procedure(s) (LRB):  ARTHROSCOPY, KNEE, WITH MENISCECTOMY (Left)    Final Anesthesia Type: general    Patient location during evaluation: PACU  Patient participation: Yes- Able to Participate  Level of consciousness: awake and alert, oriented and awake  Post-procedure vital signs: reviewed and stable  Pain management: adequate  Airway patency: patent    PONV status at discharge: No PONV  Anesthetic complications: no      Cardiovascular status: blood pressure returned to baseline and hemodynamically stable  Respiratory status: unassisted, spontaneous ventilation and room air  Hydration status: euvolemic  Follow-up not needed.          Vitals Value Taken Time   /56 07/17/20 1346   Temp 36.7 °C (98.1 °F) 07/17/20 1255   Pulse 46 07/17/20 1350   Resp 10 07/17/20 1350   SpO2 93 % 07/17/20 1350   Vitals shown include unvalidated device data.      No case tracking events are documented in the log.      Pain/Juve Score: Juve Score: 8 (7/17/2020  1:35 PM)

## 2020-07-17 NOTE — PLAN OF CARE
Pt transferred to phase II. VSS, pain tolerable and pain pill given, dressing to L knee cdi, brace on to L knee and cryo on to L knee, tolerated clear liquids without N/V. Report given to JUAN RAMON Senior.

## 2020-07-17 NOTE — ANESTHESIA PREPROCEDURE EVALUATION
07/17/2020  Nabil Cristobal is a 30 y.o., male.    Anesthesia Evaluation    I have reviewed the Patient Summary Reports.    I have reviewed the Nursing Notes.       Review of Systems  Anesthesia Hx:  No problems with previous Anesthesia    Cardiovascular:  Cardiovascular Normal     Pulmonary:   Asthma    Psych:   Psychiatric History anxiety depression          Physical Exam  General:  Well nourished, Obesity    Airway/Jaw/Neck:  Airway Findings: Mouth Opening: Normal Tongue: Normal  General Airway Assessment: Adult  Mallampati: I  TM Distance: Normal, at least 6 cm  Jaw/Neck Findings:  Neck ROM: Normal ROM     Eyes/Ears/Nose:  Eyes/Ears/Nose Findings:    Dental:  Dental Findings:   Chest/Lungs:  Chest/Lungs Findings: Normal Respiratory Rate     Heart/Vascular:  Heart Findings: Rate: Normal  Rhythm: Regular Rhythm        Mental Status:  Mental Status Findings:  Cooperative, Alert and Oriented         Anesthesia Plan  Type of Anesthesia, risks & benefits discussed:  Anesthesia Type:  general  Patient's Preference: General  Intra-op Monitoring Plan: standard ASA monitors  Intra-op Monitoring Plan Comments:   Post Op Pain Control Plan: per primary service following discharge from PACU  Post Op Pain Control Plan Comments:   Induction:   IV  Beta Blocker:  Patient is not currently on a Beta-Blocker (No further documentation required).       Informed Consent: Patient understands risks and agrees with Anesthesia plan.  Questions answered. Anesthesia consent signed with patient.  ASA Score: 2     Day of Surgery Review of History & Physical:    H&P update referred to the surgeon.         Ready For Surgery From Anesthesia Perspective.

## 2020-07-17 NOTE — H&P
Past Medical History:   Diagnosis Date    ADHD (attention deficit hyperactivity disorder)      Anxiety      Depression      PTSD (post-traumatic stress disorder)      Rage                Past Surgical History:   Procedure Laterality Date    ADENOIDECTOMY        SINUS SURGERY        TONSILLECTOMY                 Current Outpatient Medications   Medication Sig    epinephrine (EPIPEN) 0.3 mg/0.3 mL AtIn Inject 0.3 mLs (0.3 mg total) into the muscle as needed.    ibuprofen (ADVIL,MOTRIN) 800 MG tablet Take 1 tablet (800 mg total) by mouth every 6 (six) hours as needed for Pain.    oxyCODONE-acetaminophen (PERCOCET) 7.5-325 mg per tablet Take 1 tablet by mouth every 6 (six) hours as needed for Pain.      No current facility-administered medications for this visit.              Review of patient's allergies indicates:   Allergen Reactions    Pcn [penicillins] Anaphylaxis    Codeine                Family History   Problem Relation Age of Onset    Mental illness Mother      Mental illness Maternal Uncle          Social History               Socioeconomic History    Marital status: Single       Spouse name: Not on file    Number of children: Not on file    Years of education: Not on file    Highest education level: Not on file   Occupational History    Not on file   Social Needs    Financial resource strain: Not on file    Food insecurity:       Worry: Not on file       Inability: Not on file    Transportation needs:       Medical: Not on file       Non-medical: Not on file   Tobacco Use    Smoking status: Current Every Day Smoker       Packs/day: 1.00       Types: Cigarettes   Substance and Sexual Activity    Alcohol use: Yes    Drug use: No    Sexual activity: Not on file   Lifestyle    Physical activity:       Days per week: Not on file       Minutes per session: Not on file    Stress: Not on file   Relationships    Social connections:       Talks on phone: Not on file       Gets together:  Not on file       Attends Lutheran service: Not on file       Active member of club or organization: Not on file       Attends meetings of clubs or organizations: Not on file       Relationship status: Not on file   Other Topics Concern    Not on file   Social History Narrative    Not on file           Chief Complaint:       Chief Complaint   Patient presents with    Right Knee - Pain     Interval history:  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 Patient was involved in an accident on June 26th.  Patient fell off a ladder about 10 feet and landed hard onto his feet.  Since then he has had bilateral knee pain.  Patient had knee issues before but they have been worsened since then.  Has some paresthesias in both legs that existed prior to the injury.  Pain is a 4/10.  Knees feel like a burning and on fire.  Wakes him up from sleep at times. Denies swelling or mechanical symptoms. No prior treatment for his knees.  He did suffer a calcaneus fracture at that time.     History of present illness:   Continues to complain of left knee instability.  Knee feels like it wants to give out all the time.  Knee swells all the time.  MRI confirmed the reasons for the instability.  He has both a bucket-handle tear of his medial meniscus and a complete ACL tear. Also complaining of occasional buckling of the right knee and constant back pain.  All this has been present since the fall.        Review of Systems:     Musculoskeletal:  See HPI     Physical Examination:     Vital Signs:        Vitals:     02/27/20 1451   Resp: 16        Body mass index is 35.67 kg/m².     This a well-developed, well nourished patient in no acute distress.  They are alert and oriented and cooperative to examination.  Pt. walks without an antalgic gait.       Examination of left knee shows no rashes or erythema. There are no masses ecchymosis or effusion. Patient has full range of motion from 0-130°. Patient is  mildly tender tender to palpation over lateral joint line and moderately tender to palpation over the medial joint line.  Knee is stable to varus and valgus stress. 5 out of 5 motor strength. Palpable distal pulses. Intact light touch sensation. Negative Patellofemoral crepitus.  Some pain along the lateral IT band of the left knee.     Examination of the right knee shows no rashes or erythema. There are no masses ecchymosis or effusion. Patient has full range of motion from 0-130°. Patient is nontender to palpation over lateral joint line and moderately tender to palpation over the medial joint line. Patient has a - Lachman exam, - anterior drawer exam, and - posterior drawer exam. - Apoorva's exam. Knee is stable to varus and valgus stress. 5 out of 5 motor strength. Palpable distal pulses. Intact light touch sensation. Negative Patellofemoral crepitus     Heat: regular rate  Lungs: no audible wheezes  Abdomen: soft        X-rays:  X-rays of both knees are reviewed which show some mild medial joint space narrowing     MRI of the left knee:Large bucket-handle tear of the medial meniscus.  Most of the meniscal tissue is in the intercondylar notch.  Complete tear of the anterior cruciate ligament.  Large joint effusion.  Edema of the central tibia underlying the intercondylar tibial eminences, likely related to the cruciate ligament injury.     MRI of the right knee: Bone contusion of the lateral tibial plateau and undersurface tear of the posterior horn medial meniscus.     MRI of the lumbar spine: 1. Multilevel degenerative changes of the lumbar spine, as detailed above, which contribute to mild spinal canal stenosis at L3-4 and mild-to-moderate neural foraminal stenosis at L3-4 through L5-S1, most pronounced on the right at L5-S1.     Assessment:  Left ACL tear with large bucket-handle tear of the medial meniscus  Small right medial meniscal tear   Possible lumbar spine injury     Plan:  I reviewed the MRI of his  left knee.  He has had more complaints of his left knee buckling and swelling that he had previously.  Given the severity of the findings on his left knee MRI, I do think it is prudent to go ahead an MRI both his right knee and his lumbar spine.  Unfortunately, the bilateral calcaneus fractures got all the attention after the injury.  Plan is for left medial meniscal repair versus menisectomy. Risks, benefits, and alternatives to the procedure were explained to the patient including but not limited to damage to nerves, arteries, blood vessels, infection, DVT, PE as well as general anesthetic complications including seizure, stroke, heart attack and even death. The patient understood these risks and wished to proceed and signed the informed consent.            This note was created using [employer] voice recognition software that occasionally misinterpreted phrases or words.     Consult note is delivered via Epic messaging service.

## 2020-07-17 NOTE — OP NOTE
[facility]  Orthopedic Surgery  Operative Note    SUMMARY     Date of Procedure: 7/17/2020     Procedure: Procedure(s) (LRB):  ARTHROSCOPY, KNEE, WITH partial medial MENISCECTOMY (Left)       Surgeon(s) and Role:     * Harinder Mock MD - Primary    Assistant: Maura Huertas    Pre-Operative Diagnosis: Acute medial meniscus tear of left knee, subsequent encounter [S83.242D]    Post-Operative Diagnosis: Post-Op Diagnosis Codes:     * Acute medial meniscus tear of left knee, subsequent encounter [S83.242D]    Anesthesia: General    Diagnostic arthroscopy findings: Diagnostic arthroscopy findings, the patient's medial compartment was thoroughly examined. The patient had mild cartilage wear and chronic locked bucket-handle tear of a large portion of his posterior horn and body of the medial meniscus.  Meniscus had calcified and was irreducible.  He also had a complete rupture of the ACL with no visible fibers left.  PCL w was intact and  good tension. Lateral compartment showed no tearing as well with no articular   wear. In the patellofemoral joint, the patient had good central tracking without tilt or chondromalacia    Complications: No    Estimated Blood Loss (EBL): * No values recorded between 7/17/2020 12:24 PM and 7/17/2020 12:46 PM *    Tourniquet Time: 16min at 300mmHg           Implants: * No implants in log *    Specimens:   Specimen (12h ago, onward)    None                  Condition: Good    Disposition: PACU - hemodynamically stable.    Attestation: I was present and scrubbed for the entire procedure.    INDICATIONS FOR THE PROCEDURE:  30-year-old male who was injured on the job several months ago.  Patient sustained bilateral foot fractures which took priority and were treated 1st.  When he was doing some therapy continued to have knee pain.  We eventually were able to get MRIs which showed the injury listed above.    PROCEDURE IN DETAIL: Risks, benefits and alternatives of the  procedure were   explained to the patient including, but not limited to damage to nerves,   arteries, blood vessels. Also explained risk of infection, DVT, PE, continued pain due to arthritis,  as well as   anesthetic complications including seizure, stroke, heart attack and death. They  understood this and signed informed consent. The patient's Left knee was marked prior to coming to the Operating Room. Once there a formal   timeout was done in which correct patient, procedure and op site were all   correctly identified and confirmed by the entire operating team. Ancef 2 g was   given prior to surgical incision. Laryngeal mask anesthesia was induced. The   patient's Left lower extremity was prepped and draped in normal sterile fashion.   Leg was then exsanguinated with a tourniquet and tourniquet was inflated up   300 mmHg. Standard inferior lateral portal was then made. A spinal needle was   used to localize an inferior medial portal and this was made under direct   arthroscopic visualization. Diagnostic arthroscopy was then performed with the   findings listed above. Shaver was used to remove redundant fat pad and   Synovium within the notch.  We attempted to reduce the meniscus 1st so that we could possibly try and repair it but the meniscus had shortened and calcified some much it was not reducible repairable.  We then used a combination of arthroscopic biters and several Otf to resect the displaced portion the meniscus back to more stable tissue.    Proceeded with closing. All   excess water was removed from the knee joint. Portals were closed using   nylons. Portal was then injected with 0.25% Marcaine with epinephrine. Sterile   dressing was then applied. They were then extubated and awakened and transferred   from the Operating Room to the Recovery Room in stable condition.     Postop course is for an arthroscopic partial medial meniscectomy

## 2020-07-17 NOTE — DISCHARGE INSTRUCTIONS
"Discharge Instructions: After Your Surgery/Procedure  Youve just had surgery. During surgery you were given medicine called anesthesia to keep you relaxed and free of pain. After surgery you may have some pain or nausea. This is common. Here are some tips for feeling better and getting well after surgery.     Stay on schedule with your medication.   Going home  Your doctor or nurse will show you how to take care of yourself when you go home. He or she will also answer your questions. Have an adult family member or friend drive you home.      For your safety we recommend these precaution for the first 24 hours after your procedure:  · Do not drive or use heavy equipment.  · Do not make important decisions or sign legal papers.  · Do not drink alcohol.  · Have someone stay with you, if needed. He or she can watch for problems and help keep you safe.  · Your concentration, balance, coordination, and judgement may be impaired for many hours after anesthesia.  Use caution when ambulating or standing up.     · You may feel weak and "washed out" after anesthesia and surgery.      Subtle residual effects of general anesthesia or sedation with regional / local anesthesia can last more than 24 hours.  Rest for the remainder of the day or longer if your Doctor/Surgeon has advised you to do so.  Although you may feel normal within the first 24 hours, your reflexes and mental ability may be impaired without you realizing it.  You may feel dizzy, lightheaded or sleepy for 24 hours or longer.      Be sure to go to all follow-up visits with your doctor. And rest after your surgery for as long as your doctor tells you to.  Coping with pain  If you have pain after surgery, pain medicine will help you feel better. Take it as told, before pain becomes severe. Also, ask your doctor or pharmacist about other ways to control pain. This might be with heat, ice, or relaxation. And follow any other instructions your surgeon or nurse gives " you.  Tips for taking pain medicine  To get the best relief possible, remember these points:  · Pain medicines can upset your stomach. Taking them with a little food may help.  · Most pain relievers taken by mouth need at least 20 to 30 minutes to start to work.  · Taking medicine on a schedule can help you remember to take it. Try to time your medicine so that you can take it before starting an activity. This might be before you get dressed, go for a walk, or sit down for dinner.  · Constipation is a common side effect of pain medicines. Call your doctor before taking any medicines such as laxatives or stool softeners to help ease constipation. Also ask if you should skip any foods. Drinking lots of fluids and eating foods such as fruits and vegetables that are high in fiber can also help. Remember, do not take laxatives unless your surgeon has prescribed them.  · Drinking alcohol and taking pain medicine can cause dizziness and slow your breathing. It can even be deadly. Do not drink alcohol while taking pain medicine.  · Pain medicine can make you react more slowly to things. Do not drive or run machinery while taking pain medicine.  Your health care provider may tell you to take acetaminophen to help ease your pain. Ask him or her how much you are supposed to take each day. Acetaminophen or other pain relievers may interact with your prescription medicines or other over-the-counter (OTC) drugs. Some prescription medicines have acetaminophen and other ingredients. Using both prescription and OTC acetaminophen for pain can cause you to overdose. Read the labels on your OTC medicines with care. This will help you to clearly know the list of ingredients, how much to take, and any warnings. It may also help you not take too much acetaminophen. If you have questions or do not understand the information, ask your pharmacist or health care provider to explain it to you before you take the OTC medicine.  Managing  nausea  Some people have an upset stomach after surgery. This is often because of anesthesia, pain, or pain medicine, or the stress of surgery. These tips will help you handle nausea and eat healthy foods as you get better. If you were on a special food plan before surgery, ask your doctor if you should follow it while you get better. These tips may help:  · Do not push yourself to eat. Your body will tell you when to eat and how much.  · Start off with clear liquids and soup. They are easier to digest.  · Next try semi-solid foods, such as mashed potatoes, applesauce, and gelatin, as you feel ready.  · Slowly move to solid foods. Dont eat fatty, rich, or spicy foods at first.  · Do not force yourself to have 3 large meals a day. Instead eat smaller amounts more often.  · Take pain medicines with a small amount of solid food, such as crackers or toast, to avoid nausea.     Call your surgeon if  · You still have pain an hour after taking medicine. The medicine may not be strong enough.  · You feel too sleepy, dizzy, or groggy. The medicine may be too strong.  · You have side effects like nausea, vomiting, or skin changes, such as rash, itching, or hives.       If you have obstructive sleep apnea  You were given anesthesia medicine during surgery to keep you comfortable and free of pain. After surgery, you may have more apnea spells because of this medicine and other medicines you were given. The spells may last longer than usual.   At home:  · Keep using the continuous positive airway pressure (CPAP) device when you sleep. Unless your health care provider tells you not to, use it when you sleep, day or night. CPAP is a common device used to treat obstructive sleep apnea.  · Talk with your provider before taking any pain medicine, muscle relaxants, or sedatives. Your provider will tell you about the possible dangers of taking these medicines.  © 3689-1659 [employer]. [address] [place] [address]  [place] [address]. All rights reserved. This information is not intended as a substitute for professional medical care. Always follow your healthcare professional's instructions.    Post op instructions for prevention of DVT  What is deep vein thrombosis?  Deep vein thrombosis (DVT) is the medical term for blood clots in the deep veins of the leg.  These blood clots can be dangerous.  A DVT can block a blood vessel and keep blood from getting where it needs to go.  Another problem is that the clot can travel to other parts of the body such as the lungs.  A clot that travels to the lungs is called a pulmonary embolus (PE) and can cause serious problems with breathing which can lead to death.  Am I at risk for DVT/PE?  If you are not very active, you are at risk of DVT.  Anyone confined to bed, sitting for long periods of time, recovering from surgery, etc. increases the risk of DVT.  Other risk factors are cancer diagnosis, certain medications, estrogen replacement in any form,older age, obesity, pregnancy, smoking, history of clotting disorders, and dehydration.  How will I know if I have a DVT?   Swelling in the lower leg   Pain   Warmth, redness, hardness or bulging of the vein  If you have any of these symptoms, call your doctors office right away.  Some people will not have any symptoms until the clot moves to the lungs.  What are the symptoms of a PE?   Panting, shortness of breath, or trouble breathing   Sharp, knife-like chest pain when you breathe   Coughing or coughing up blood   Rapid heartbeat  If you have any of these symptoms or get worse quickly, call 911 for emergency treatment.  How can I prevent a DVT?   Avoid long periods of inactivity and dont cross your legs--get up and walk around every hour or so.   Stay active--walking after surgery is highly encouraged.  This means you should get out of the house and walk in the neighborhood.  Going up and down stairs will not impair healing (unless advised  against such activity by your doctor).     Drink plenty of noncaffeinated, nonalcoholic fluids each day to prevent dehydration.   Wear special support stockings, if they have been advised by your doctor.   If you travel, stop at least once an hour and walk around.   Avoid smoking (assistance with stopping is available through your healthcare provider)  Always notify your doctor if you are not able to follow the post operative instructions that are given to you at the time of discharge.  It may be necessary to prescribe one of the medications available to prevent DVT.    We hope your stay was comfortable as you heal now, mend and rest.    For we have enjoyed taking care of you by giving your our best.    And as you get better, by regaining your health and strength;   We count it as a privilege to have served you and hope your time at [facility] was well spent.      Thank  You!!!

## 2020-07-17 NOTE — DISCHARGE SUMMARY
[facility]  Discharge Note  Short Stay    Admit Date: 7/17/2020    Discharge Date and Time: 7/17/2020    Attending Physician: Harinder Mock MD     Discharge Provider: Harinder Mock    Diagnoses:  Active Hospital Problems    Diagnosis  POA    *Acute medial meniscal tear, left, sequela [S81.135S]  Not Applicable      Resolved Hospital Problems   No resolved problems to display.       Discharged Condition: good    Hospital Course: Patient was admitted for an outpatient procedure and tolerated the procedure well with no complications.    Final Diagnoses: Same as principal problem.    Disposition: Home or Self Care    Follow up/Patient Instructions:    Medications:  Reconciled Home Medications:      Medication List      START taking these medications    aspirin 81 MG EC tablet  Commonly known as: ECOTRIN  Take 1 tablet (81 mg total) by mouth 2 (two) times daily.        CONTINUE taking these medications    EPINEPHrine 0.3 mg/0.3 mL Atin  Commonly known as: EPIPEN  Inject 0.3 mLs (0.3 mg total) into the muscle as needed.     ibuprofen 800 MG tablet  Commonly known as: ADVIL,MOTRIN  Take 1 tablet (800 mg total) by mouth every 6 (six) hours as needed for Pain.     oxyCODONE-acetaminophen 7.5-325 mg per tablet  Commonly known as: PERCOCET  Take 1 tablet by mouth every 6 (six) hours as needed for Pain.          Discharge Procedure Orders   Diet Adult Regular     Keep surgical extremity elevated     Ice to affected area     Call MD for:  temperature >100.4     Call MD for:  severe uncontrolled pain     Call MD for:  redness, tenderness, or signs of infection (pain, swelling, redness, odor or green/yellow discharge around incision site)     Remove dressing in 24 hours     Change dressing (specify)   Order Comments: Dressing change: One time per day using Waterproof Bandaids.     Activity as tolerated     Shower on day dressing removed (No bath)     Weight bearing restrictions (specify):          Discharge Procedure Orders (must include Diet, Follow-up, Activity):   Discharge Procedure Orders (must include Diet, Follow-up, Activity)   Diet Adult Regular     Keep surgical extremity elevated     Ice to affected area     Call MD for:  temperature >100.4     Call MD for:  severe uncontrolled pain     Call MD for:  redness, tenderness, or signs of infection (pain, swelling, redness, odor or green/yellow discharge around incision site)     Remove dressing in 24 hours     Change dressing (specify)   Order Comments: Dressing change: One time per day using Waterproof Bandaids.     Activity as tolerated     Shower on day dressing removed (No bath)     Weight bearing restrictions (specify):

## 2020-07-17 NOTE — TRANSFER OF CARE
Anesthesia Transfer of Care Note    Patient: Nabil Cristobal    Procedure(s) Performed: Procedure(s) (LRB):  ARTHROSCOPY, KNEE, WITH MENISCECTOMY (Left)    Patient location: PACU    Anesthesia Type: general    Transport from OR: Transported from OR on 2-3 L/min O2 by NC with adequate spontaneous ventilation    Post pain: adequate analgesia    Post assessment: no apparent anesthetic complications    Post vital signs: stable    Level of consciousness: awake    Nausea/Vomiting: no nausea/vomiting    Complications: none    Transfer of care protocol was followed      Last vitals:   Visit Vitals  BP (!) 155/88   Pulse 66   Temp 36.7 °C (98 °F)   Resp 18   Ht 6' (1.829 m)   Wt 119.3 kg (263 lb)   SpO2 96%   BMI 35.67 kg/m²

## 2020-07-17 NOTE — ANESTHESIA PROCEDURE NOTES
Intubation  Performed by: Jesus Berry CRNA  Authorized by: Simin Kent MD     Intubation:     Induction:  Intravenous    Intubated:  Postinduction    Mask Ventilation:  N/a    Attempts:  1    Attempted By:  CRNA    Difficult Airway Encountered?: No      Complications:  None    Airway Device:  Supraglottic airway/LMA    Airway Device Size:  4.0    Style/Cuff Inflation:  Cuffed    Secured at:  The lips    Placement Verified By:  Capnometry    DIFFICULT INTUBATION DESCRIPTOR: full beard.    Findings Post-Intubation:  BS equal bilateral

## 2020-07-17 NOTE — PLAN OF CARE
Pt in phase II rec. Has left leg bandaged wrapped with ace wrap and has immobilizer on  Pt states pain in left leg 7/10 but tolerable  Aunt at bedside explained dc instructions to pt and aunt along wibenji use of ice machine, both verb understnaing  Called dr ivan to ask about immobilizer  Immobilizer during the day, at night he can take it off. While walking need the immobilizer on.

## 2020-07-30 ENCOUNTER — OFFICE VISIT (OUTPATIENT)
Dept: ORTHOPEDICS | Facility: CLINIC | Age: 30
End: 2020-07-30
Payer: OTHER MISCELLANEOUS

## 2020-07-30 VITALS — BODY MASS INDEX: 35.62 KG/M2 | WEIGHT: 263 LBS | HEIGHT: 72 IN | TEMPERATURE: 98 F

## 2020-07-30 DIAGNOSIS — S83.242D ACUTE MEDIAL MENISCUS TEAR OF LEFT KNEE, SUBSEQUENT ENCOUNTER: ICD-10-CM

## 2020-07-30 DIAGNOSIS — Z98.890 STATUS POST ARTHROSCOPY OF LEFT KNEE: Primary | ICD-10-CM

## 2020-07-30 PROCEDURE — 99999 PR PBB SHADOW E&M-EST. PATIENT-LVL III: CPT | Mod: PBBFAC,,, | Performed by: ORTHOPAEDIC SURGERY

## 2020-07-30 PROCEDURE — 99024 PR POST-OP FOLLOW-UP VISIT: ICD-10-PCS | Mod: S$GLB,,, | Performed by: ORTHOPAEDIC SURGERY

## 2020-07-30 PROCEDURE — 99999 PR PBB SHADOW E&M-EST. PATIENT-LVL III: ICD-10-PCS | Mod: PBBFAC,,, | Performed by: ORTHOPAEDIC SURGERY

## 2020-07-30 PROCEDURE — 99024 POSTOP FOLLOW-UP VISIT: CPT | Mod: S$GLB,,, | Performed by: ORTHOPAEDIC SURGERY

## 2020-07-30 NOTE — PROGRESS NOTES
Past Medical History:   Diagnosis Date    ADHD (attention deficit hyperactivity disorder)     Anxiety     Asthma     Depression     PTSD (post-traumatic stress disorder)     Rage        Past Surgical History:   Procedure Laterality Date    ADENOIDECTOMY      KNEE ARTHROSCOPY W/ MENISCECTOMY Left 7/17/2020    Procedure: ARTHROSCOPY, KNEE, WITH MENISCECTOMY;  Surgeon: Harinder Mock MD;  Location: [facility];  Service: Orthopedics;  Laterality: Left;  Medial Meniscectomy    SINUS SURGERY      TONSILLECTOMY         Current Outpatient Medications   Medication Sig    aspirin (ECOTRIN) 81 MG EC tablet Take 1 tablet (81 mg total) by mouth 2 (two) times daily.    ibuprofen (ADVIL,MOTRIN) 800 MG tablet Take 1 tablet (800 mg total) by mouth every 6 (six) hours as needed for Pain.    oxyCODONE-acetaminophen (PERCOCET) 7.5-325 mg per tablet Take 1 tablet by mouth every 6 (six) hours as needed for Pain.    epinephrine (EPIPEN) 0.3 mg/0.3 mL AtIn Inject 0.3 mLs (0.3 mg total) into the muscle as needed.     No current facility-administered medications for this visit.      Facility-Administered Medications Ordered in Other Visits   Medication    electrolyte-S (ISOLYTE)    lactated ringers infusion    lidocaine (PF) 10 mg/ml (1%) injection 5 mg       Review of patient's allergies indicates:   Allergen Reactions    Bee pollens Anaphylaxis    Pcn [penicillins] Anaphylaxis    Codeine      Rash      Plaster of missy [bulk chemical] Rash       Family History   Problem Relation Age of Onset    Mental illness Mother     Mental illness Maternal Uncle        Social History     Socioeconomic History    Marital status: Single     Spouse name: Not on file    Number of children: Not on file    Years of education: Not on file    Highest education level: Not on file   Occupational History    Not on file   Social Needs    Financial resource strain: Not on file    Food insecurity     Worry: Not on file      Inability: Not on file    Transportation needs     Medical: Not on file     Non-medical: Not on file   Tobacco Use    Smoking status: Current Every Day Smoker     Packs/day: 1.00     Types: Cigarettes    Smokeless tobacco: Never Used   Substance and Sexual Activity    Alcohol use: Yes    Drug use: No    Sexual activity: Not on file   Lifestyle    Physical activity     Days per week: Not on file     Minutes per session: Not on file    Stress: Not on file   Relationships    Social connections     Talks on phone: Not on file     Gets together: Not on file     Attends Jain service: Not on file     Active member of club or organization: Not on file     Attends meetings of clubs or organizations: Not on file     Relationship status: Not on file   Other Topics Concern    Not on file   Social History Narrative    Not on file       Chief Complaint:   Chief Complaint   Patient presents with    Post-op Evaluation     s/p knee scope 7/17/20        Date of surgery:  July 17, 2020    History of present illness:  30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  Patient had an ACL tear as well as a bucket-handle tear of the medial meniscus.  The bucket-handle was so scarred and that we were unable to reduce it and had to excise it.  It was a very large portion of his meniscus.  He has centrally has a complete ACL tear and a functional a lack of medial meniscus.  He already had a little really arthritis as well the medial compartment.  Pain is a 5/10.  Compliant with the brace and the crutches.  No wound issues.      Review of Systems:    Musculoskeletal:  See HPI        Physical Examination:    Vital Signs:    Vitals:    07/30/20 1512   Temp: 97.7 °F (36.5 °C)       Body mass index is 35.67 kg/m².    This a well-developed, well nourished patient in no acute distress.  They are alert and oriented and cooperative to examination.  Pt. walks without an antalgic gait.       Examination left knee shows well-healing surgical portals.  No erythema or drainage.  No significant swelling.  Range of motion 0-120.  No calf pain.  Negative Homans sign.    X-rays:  None     Assessment::  Status post left partial medial meniscectomy    Plan:  I reviewed the findings with him today.  We talked about the extensive findings during the surgery.  Patient would likely benefit from a more extensive surgery including possible ACL reconstruction, medial meniscus transplant and possible unloading osteotomy.  We will get him in some physical therapy to get him over the acuteness of this surgery 1st.  Patient is on light or sedentary duty only.  Follow-up in 4 weeks.    This note was created using [employer] voice recognition software that occasionally misinterpreted phrases or words.

## 2020-08-27 ENCOUNTER — OFFICE VISIT (OUTPATIENT)
Dept: ORTHOPEDICS | Facility: CLINIC | Age: 30
End: 2020-08-27
Payer: OTHER MISCELLANEOUS

## 2020-08-27 VITALS — TEMPERATURE: 98 F | BODY MASS INDEX: 35.62 KG/M2 | WEIGHT: 263 LBS | RESPIRATION RATE: 17 BRPM | HEIGHT: 72 IN

## 2020-08-27 DIAGNOSIS — Z98.890 STATUS POST ARTHROSCOPY OF LEFT KNEE: Primary | ICD-10-CM

## 2020-08-27 DIAGNOSIS — S83.282S ACUTE LATERAL MENISCAL TEAR, LEFT, SEQUELA: ICD-10-CM

## 2020-08-27 PROCEDURE — 99999 PR PBB SHADOW E&M-EST. PATIENT-LVL III: ICD-10-PCS | Mod: PBBFAC,,, | Performed by: ORTHOPAEDIC SURGERY

## 2020-08-27 PROCEDURE — 99024 POSTOP FOLLOW-UP VISIT: CPT | Mod: S$GLB,,, | Performed by: ORTHOPAEDIC SURGERY

## 2020-08-27 PROCEDURE — 99999 PR PBB SHADOW E&M-EST. PATIENT-LVL III: CPT | Mod: PBBFAC,,, | Performed by: ORTHOPAEDIC SURGERY

## 2020-08-27 PROCEDURE — 99024 PR POST-OP FOLLOW-UP VISIT: ICD-10-PCS | Mod: S$GLB,,, | Performed by: ORTHOPAEDIC SURGERY

## 2020-08-27 NOTE — PROGRESS NOTES
Past Medical History:   Diagnosis Date    ADHD (attention deficit hyperactivity disorder)     Anxiety     Asthma     Depression     PTSD (post-traumatic stress disorder)     Rage        Past Surgical History:   Procedure Laterality Date    ADENOIDECTOMY      KNEE ARTHROSCOPY W/ MENISCECTOMY Left 7/17/2020    Procedure: ARTHROSCOPY, KNEE, WITH MENISCECTOMY;  Surgeon: Harinder Mock MD;  Location: [facility];  Service: Orthopedics;  Laterality: Left;  Medial Meniscectomy    SINUS SURGERY      TONSILLECTOMY         Current Outpatient Medications   Medication Sig    aspirin (ECOTRIN) 81 MG EC tablet Take 1 tablet (81 mg total) by mouth 2 (two) times daily.    ibuprofen (ADVIL,MOTRIN) 800 MG tablet Take 1 tablet (800 mg total) by mouth every 6 (six) hours as needed for Pain.    oxyCODONE-acetaminophen (PERCOCET) 7.5-325 mg per tablet Take 1 tablet by mouth every 6 (six) hours as needed for Pain.    epinephrine (EPIPEN) 0.3 mg/0.3 mL AtIn Inject 0.3 mLs (0.3 mg total) into the muscle as needed.     No current facility-administered medications for this visit.      Facility-Administered Medications Ordered in Other Visits   Medication    electrolyte-S (ISOLYTE)    lactated ringers infusion    lidocaine (PF) 10 mg/ml (1%) injection 5 mg       Review of patient's allergies indicates:   Allergen Reactions    Bee pollens Anaphylaxis    Pcn [penicillins] Anaphylaxis    Codeine      Rash      Plaster of missy [bulk chemical] Rash       Family History   Problem Relation Age of Onset    Mental illness Mother     Mental illness Maternal Uncle        Social History     Socioeconomic History    Marital status: Single     Spouse name: Not on file    Number of children: Not on file    Years of education: Not on file    Highest education level: Not on file   Occupational History    Not on file   Social Needs    Financial resource strain: Not on file    Food insecurity     Worry: Not on file      Inability: Not on file    Transportation needs     Medical: Not on file     Non-medical: Not on file   Tobacco Use    Smoking status: Current Every Day Smoker     Packs/day: 1.00     Types: Cigarettes    Smokeless tobacco: Never Used   Substance and Sexual Activity    Alcohol use: Yes    Drug use: No    Sexual activity: Not on file   Lifestyle    Physical activity     Days per week: Not on file     Minutes per session: Not on file    Stress: Not on file   Relationships    Social connections     Talks on phone: Not on file     Gets together: Not on file     Attends Pentecostalism service: Not on file     Active member of club or organization: Not on file     Attends meetings of clubs or organizations: Not on file     Relationship status: Not on file   Other Topics Concern    Not on file   Social History Narrative    Not on file       Chief Complaint:   Chief Complaint   Patient presents with    Left Knee - Post-op Evaluation     s/p knee scope 7/17/20             Date of surgery:  July 17, 2020    History of present illness:  30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  Patient had an ACL tear as well as a bucket-handle tear of the medial meniscus.  The bucket-handle was so scarred and that we were unable to reduce it and had to excise it.  It was a very large portion of his meniscus.  He has centrally has a complete ACL tear and a functional a lack of medial meniscus.  He already had a little really arthritis as well the medial compartment.  Pain is a 0/10.  Working with physical therapy.  He is making good progress.  Does not feel like he can stand up for 8 hours at a time like he would need to for his job      Review of Systems:    Musculoskeletal:  See HPI        Physical Examination:    Vital Signs:    Vitals:    08/27/20 1450   Resp: 17   Temp: 97.7 °F (36.5 °C)       Body mass index is 35.67 kg/m².    This a well-developed, well nourished patient in  no acute distress.  They are alert and oriented and cooperative to examination.  Pt. walks without an antalgic gait.      Examination left knee shows healed surgical portals.  No erythema or drainage.  No significant swelling.  Range of motion 0-120.  No calf pain.  Negative Homans sign.  No real joint line pain today.    X-rays:  None     Assessment::  Status post left partial medial meniscectomy    Plan: Patient might benefit from a more extensive surgery including possible ACL reconstruction, medial meniscus transplant and possible unloading osteotomy if he continues to have significant pain and limitations.  Continue with physical therapy to get him over the acuteness of this surgery 1st.  Patient is on light or sedentary duty only.  Follow-up in 6 weeks.    This note was created using Wi[employer] voice recognition software that occasionally misinterpreted phrases or words.

## 2020-10-08 ENCOUNTER — OFFICE VISIT (OUTPATIENT)
Dept: ORTHOPEDICS | Facility: CLINIC | Age: 30
End: 2020-10-08
Payer: OTHER MISCELLANEOUS

## 2020-10-08 VITALS — HEIGHT: 72 IN | RESPIRATION RATE: 16 BRPM | WEIGHT: 263 LBS | BODY MASS INDEX: 35.62 KG/M2

## 2020-10-08 DIAGNOSIS — S83.282S ACUTE LATERAL MENISCAL TEAR, LEFT, SEQUELA: ICD-10-CM

## 2020-10-08 DIAGNOSIS — S83.242D ACUTE MEDIAL MENISCUS TEAR OF LEFT KNEE, SUBSEQUENT ENCOUNTER: ICD-10-CM

## 2020-10-08 DIAGNOSIS — Z98.890 STATUS POST ARTHROSCOPY OF LEFT KNEE: Primary | ICD-10-CM

## 2020-10-08 PROCEDURE — 99024 POSTOP FOLLOW-UP VISIT: CPT | Mod: S$GLB,,, | Performed by: ORTHOPAEDIC SURGERY

## 2020-10-08 PROCEDURE — 99024 PR POST-OP FOLLOW-UP VISIT: ICD-10-PCS | Mod: S$GLB,,, | Performed by: ORTHOPAEDIC SURGERY

## 2020-10-08 PROCEDURE — 99999 PR PBB SHADOW E&M-EST. PATIENT-LVL II: CPT | Mod: PBBFAC,,, | Performed by: ORTHOPAEDIC SURGERY

## 2020-10-08 PROCEDURE — 99999 PR PBB SHADOW E&M-EST. PATIENT-LVL II: ICD-10-PCS | Mod: PBBFAC,,, | Performed by: ORTHOPAEDIC SURGERY

## 2020-10-08 NOTE — PROGRESS NOTES
Past Medical History:   Diagnosis Date    ADHD (attention deficit hyperactivity disorder)     Anxiety     Asthma     Depression     PTSD (post-traumatic stress disorder)     Rage        Past Surgical History:   Procedure Laterality Date    ADENOIDECTOMY      KNEE ARTHROSCOPY W/ MENISCECTOMY Left 7/17/2020    Procedure: ARTHROSCOPY, KNEE, WITH MENISCECTOMY;  Surgeon: Harinder Mock MD;  Location: [facility];  Service: Orthopedics;  Laterality: Left;  Medial Meniscectomy    SINUS SURGERY      TONSILLECTOMY         Current Outpatient Medications   Medication Sig    aspirin (ECOTRIN) 81 MG EC tablet Take 1 tablet (81 mg total) by mouth 2 (two) times daily.    ibuprofen (ADVIL,MOTRIN) 800 MG tablet Take 1 tablet (800 mg total) by mouth every 6 (six) hours as needed for Pain.    oxyCODONE-acetaminophen (PERCOCET) 7.5-325 mg per tablet Take 1 tablet by mouth every 6 (six) hours as needed for Pain.    epinephrine (EPIPEN) 0.3 mg/0.3 mL AtIn Inject 0.3 mLs (0.3 mg total) into the muscle as needed.     No current facility-administered medications for this visit.      Facility-Administered Medications Ordered in Other Visits   Medication    electrolyte-S (ISOLYTE)    lactated ringers infusion    lidocaine (PF) 10 mg/ml (1%) injection 5 mg       Review of patient's allergies indicates:   Allergen Reactions    Bee pollens Anaphylaxis    Pcn [penicillins] Anaphylaxis    Codeine      Rash      Plaster of missy [bulk chemical] Rash       Family History   Problem Relation Age of Onset    Mental illness Mother     Mental illness Maternal Uncle        Social History     Socioeconomic History    Marital status: Single     Spouse name: Not on file    Number of children: Not on file    Years of education: Not on file    Highest education level: Not on file   Occupational History    Not on file   Social Needs    Financial resource strain: Not on file    Food insecurity     Worry: Not on file      Inability: Not on file    Transportation needs     Medical: Not on file     Non-medical: Not on file   Tobacco Use    Smoking status: Current Every Day Smoker     Packs/day: 1.00     Types: Cigarettes    Smokeless tobacco: Never Used   Substance and Sexual Activity    Alcohol use: Yes    Drug use: No    Sexual activity: Not on file   Lifestyle    Physical activity     Days per week: Not on file     Minutes per session: Not on file    Stress: Not on file   Relationships    Social connections     Talks on phone: Not on file     Gets together: Not on file     Attends Yarsani service: Not on file     Active member of club or organization: Not on file     Attends meetings of clubs or organizations: Not on file     Relationship status: Not on file   Other Topics Concern    Not on file   Social History Narrative    Not on file       Chief Complaint:   Chief Complaint   Patient presents with    Post-op Evaluation     s/p left knee scope 7/17/20        Date of surgery:  July 17, 2020    History:  30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  Patient had an ACL tear as well as a bucket-handle tear of the medial meniscus.  The bucket-handle was so scarred and that we were unable to reduce it and had to excise it.  It was a very large portion of his meniscus.  He essentially had a complete ACL tear and a functional lack of medial meniscus.  He already had a little early arthritis as well the medial compartment.     History of present illness:    Working with physical therapy.  He is making good progress.  He has been back at work now for a little while.  Has had some sharp pains in the back of his knee a couple times but it goes away quickly.  They are doing some massage and some Kinesio taping.  No pain today.      Review of Systems:    Musculoskeletal:  See HPI        Physical Examination:    Vital Signs:    Vitals:    10/08/20 1321   Resp: 16       Body mass  index is 35.67 kg/m².    This a well-developed, well nourished patient in no acute distress.  They are alert and oriented and cooperative to examination.  Pt. walks without an antalgic gait.      Examination left knee shows healed surgical portals.  No erythema or drainage.  No significant swelling.  Range of motion 0-120.  No calf pain.  Negative Homans sign.  No joint line pain today.    X-rays:  None     Assessment::  Status post left partial medial meniscectomy    Plan:  Continue with physical therapy.  We will clear him back for medium duty.  Follow-up in 2 months.    This note was created using M [employer] voice recognition software that occasionally misinterpreted phrases or words.

## 2020-10-27 ENCOUNTER — TELEPHONE (OUTPATIENT)
Dept: ORTHOPEDICS | Facility: CLINIC | Age: 30
End: 2020-10-27

## 2020-10-27 NOTE — TELEPHONE ENCOUNTER
----- Message from Sabi Turner sent at 10/27/2020 10:35 AM CDT -----  Good morning,  I received a progress note from [facility] for Mr. Cristobal. The note has been scanned to the patients chart under the media tab.    Thanks,  Sabi  EXT [phone]

## 2020-12-17 ENCOUNTER — OFFICE VISIT (OUTPATIENT)
Dept: ORTHOPEDICS | Facility: CLINIC | Age: 30
End: 2020-12-17
Payer: OTHER MISCELLANEOUS

## 2020-12-17 VITALS — WEIGHT: 263 LBS | BODY MASS INDEX: 35.62 KG/M2 | HEIGHT: 72 IN | RESPIRATION RATE: 16 BRPM

## 2020-12-17 DIAGNOSIS — S83.242D ACUTE MEDIAL MENISCUS TEAR OF LEFT KNEE, SUBSEQUENT ENCOUNTER: ICD-10-CM

## 2020-12-17 DIAGNOSIS — Z98.890 STATUS POST ARTHROSCOPY OF LEFT KNEE: Primary | ICD-10-CM

## 2020-12-17 PROCEDURE — 99999 PR PBB SHADOW E&M-EST. PATIENT-LVL III: ICD-10-PCS | Mod: PBBFAC,,, | Performed by: ORTHOPAEDIC SURGERY

## 2020-12-17 PROCEDURE — 99213 PR OFFICE/OUTPT VISIT, EST, LEVL III, 20-29 MIN: ICD-10-PCS | Mod: S$GLB,,, | Performed by: ORTHOPAEDIC SURGERY

## 2020-12-17 PROCEDURE — 99213 OFFICE O/P EST LOW 20 MIN: CPT | Mod: S$GLB,,, | Performed by: ORTHOPAEDIC SURGERY

## 2020-12-17 PROCEDURE — 99999 PR PBB SHADOW E&M-EST. PATIENT-LVL III: CPT | Mod: PBBFAC,,, | Performed by: ORTHOPAEDIC SURGERY

## 2020-12-17 NOTE — PROGRESS NOTES
Past Medical History:   Diagnosis Date    ADHD (attention deficit hyperactivity disorder)     Anxiety     Asthma     Depression     PTSD (post-traumatic stress disorder)     Rage        Past Surgical History:   Procedure Laterality Date    ADENOIDECTOMY      KNEE ARTHROSCOPY W/ MENISCECTOMY Left 7/17/2020    Procedure: ARTHROSCOPY, KNEE, WITH MENISCECTOMY;  Surgeon: Harinder Mock MD;  Location: [facility];  Service: Orthopedics;  Laterality: Left;  Medial Meniscectomy    SINUS SURGERY      TONSILLECTOMY         Current Outpatient Medications   Medication Sig    aspirin (ECOTRIN) 81 MG EC tablet Take 1 tablet (81 mg total) by mouth 2 (two) times daily.    ibuprofen (ADVIL,MOTRIN) 800 MG tablet Take 1 tablet (800 mg total) by mouth every 6 (six) hours as needed for Pain.    oxyCODONE-acetaminophen (PERCOCET) 7.5-325 mg per tablet Take 1 tablet by mouth every 6 (six) hours as needed for Pain.    epinephrine (EPIPEN) 0.3 mg/0.3 mL AtIn Inject 0.3 mLs (0.3 mg total) into the muscle as needed.     No current facility-administered medications for this visit.      Facility-Administered Medications Ordered in Other Visits   Medication    electrolyte-S (ISOLYTE)    lactated ringers infusion    lidocaine (PF) 10 mg/ml (1%) injection 5 mg       Review of patient's allergies indicates:   Allergen Reactions    Bee pollens Anaphylaxis    Pcn [penicillins] Anaphylaxis    Codeine      Rash      Plaster of missy [bulk chemical] Rash       Family History   Problem Relation Age of Onset    Mental illness Mother     Mental illness Maternal Uncle        Social History     Socioeconomic History    Marital status: Single     Spouse name: Not on file    Number of children: Not on file    Years of education: Not on file    Highest education level: Not on file   Occupational History    Not on file   Social Needs    Financial resource strain: Not on file    Food insecurity     Worry: Not on file      Inability: Not on file    Transportation needs     Medical: Not on file     Non-medical: Not on file   Tobacco Use    Smoking status: Current Every Day Smoker     Packs/day: 1.00     Types: Cigarettes    Smokeless tobacco: Never Used   Substance and Sexual Activity    Alcohol use: Yes    Drug use: No    Sexual activity: Not on file   Lifestyle    Physical activity     Days per week: Not on file     Minutes per session: Not on file    Stress: Not on file   Relationships    Social connections     Talks on phone: Not on file     Gets together: Not on file     Attends [employer]an service: Not on file     Active member of club or organization: Not on file     Attends meetings of clubs or organizations: Not on file     Relationship status: Not on file   Other Topics Concern    Not on file   Social History Narrative    Not on file       Chief Complaint:   Chief Complaint   Patient presents with    Post-op Evaluation     s/p left knee scope 7/17/20        Date of surgery:  July 17, 2020    History:  30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  Patient had an ACL tear as well as a bucket-handle tear of the medial meniscus.  The bucket-handle was so scarred and that we were unable to reduce it and had to excise it.  It was a very large portion of his meniscus.  He essentially had a complete ACL tear and a functional lack of medial meniscus.  He already had a little early arthritis as well the medial compartment.     History of present illness:    Working with physical therapy.  He is making good progress.  He has been back at work. Has had some sharp pains in the back of his knee a couple times but it goes away quickly.  They are doing some massage and some Kinesio taping.  No pain today.      Review of Systems:    Musculoskeletal:  See HPI        Physical Examination:    Vital Signs:    Vitals:    12/17/20 1053   Resp: 16       Body mass index is 35.67  kg/m².    This a well-developed, well nourished patient in no acute distress.  They are alert and oriented and cooperative to examination.  Pt. walks without an antalgic gait.      Examination left knee shows healed surgical portals.  No erythema or drainage.  No significant swelling.  Range of motion 0-120.  No calf pain.  Negative Homans sign.  No joint line pain today.    X-rays:  None     Assessment::  Status post left partial medial meniscectomy    Plan:  Continue with physical therapy.  We will continue him him back for medium duty.  Follow-up in 2 months.    This note was created using Vigo voice recognition software that occasionally misinterpreted phrases or words.

## 2020-12-21 DIAGNOSIS — Z98.890 STATUS POST ARTHROSCOPY OF LEFT KNEE: Primary | ICD-10-CM

## 2020-12-31 ENCOUNTER — TELEPHONE (OUTPATIENT)
Dept: ORTHOPEDICS | Facility: CLINIC | Age: 30
End: 2020-12-31

## 2020-12-31 NOTE — TELEPHONE ENCOUNTER
----- Message from Mikala Chicas MA sent at 12/31/2020 11:02 AM CST -----  Contact: DR Delaney Carter,  Wants to discuss pt   Call back  [phone]

## 2020-12-31 NOTE — TELEPHONE ENCOUNTER
Returned call. Dr. Delaney Carter with [facility] in [facility] reached out requesting to speak to Dr. Mock regarding patient's restrictions, diagnosis, and prognosis status post ARTHROSCOPY, KNEE, WITH partial medial MENISCECTOMY (Left)  on 7/17/2020. Will route message to Dr. Mock, and follow up with her accordingly. I have also reached out to patient to no avail.

## 2021-01-26 ENCOUNTER — TELEPHONE (OUTPATIENT)
Dept: ORTHOPEDICS | Facility: CLINIC | Age: 31
End: 2021-01-26

## 2021-02-22 ENCOUNTER — HOSPITAL ENCOUNTER (EMERGENCY)
Facility: HOSPITAL | Age: 31
Discharge: HOME | End: 2021-02-22
Attending: EMERGENCY MEDICINE
Payer: MEDICAID

## 2021-02-22 VITALS
HEART RATE: 59 BPM | DIASTOLIC BLOOD PRESSURE: 56 MMHG | SYSTOLIC BLOOD PRESSURE: 117 MMHG | HEIGHT: 72 IN | BODY MASS INDEX: 35.62 KG/M2 | RESPIRATION RATE: 19 BRPM | OXYGEN SATURATION: 95 % | TEMPERATURE: 99 F | WEIGHT: 263 LBS

## 2021-02-22 DIAGNOSIS — R07.9 CHEST PAIN, UNSPECIFIED TYPE: ICD-10-CM

## 2021-02-22 DIAGNOSIS — K21.9 GASTROESOPHAGEAL REFLUX DISEASE, UNSPECIFIED WHETHER ESOPHAGITIS PRESENT: ICD-10-CM

## 2021-02-22 DIAGNOSIS — R07.9 CHEST PAIN: Primary | ICD-10-CM

## 2021-02-22 LAB
ALBUMIN SERPL BCP-MCNC: 4.7 G/DL (ref 3.5–5.2)
ALP SERPL-CCNC: 55 U/L (ref 55–135)
ALT SERPL W/O P-5'-P-CCNC: 19 U/L (ref 10–44)
AMPHET+METHAMPHET UR QL: NEGATIVE
ANION GAP SERPL CALC-SCNC: 8 MMOL/L (ref 8–16)
AST SERPL-CCNC: 15 U/L (ref 10–40)
BARBITURATES UR QL SCN>200 NG/ML: NEGATIVE
BASOPHILS # BLD AUTO: 0.06 K/UL (ref 0–0.2)
BASOPHILS NFR BLD: 0.7 % (ref 0–1.9)
BENZODIAZ UR QL SCN>200 NG/ML: NEGATIVE
BILIRUB SERPL-MCNC: 1 MG/DL (ref 0.1–1)
BNP SERPL-MCNC: 47 PG/ML (ref 0–99)
BUN SERPL-MCNC: 10 MG/DL (ref 6–20)
BZE UR QL SCN: NEGATIVE
CALCIUM SERPL-MCNC: 9.3 MG/DL (ref 8.7–10.5)
CANNABINOIDS UR QL SCN: NORMAL
CHLORIDE SERPL-SCNC: 103 MMOL/L (ref 95–110)
CO2 SERPL-SCNC: 25 MMOL/L (ref 23–29)
CREAT SERPL-MCNC: 0.9 MG/DL (ref 0.5–1.4)
CREAT UR-MCNC: 75 MG/DL (ref 23–375)
DIFFERENTIAL METHOD: NORMAL
EOSINOPHIL # BLD AUTO: 0.1 K/UL (ref 0–0.5)
EOSINOPHIL NFR BLD: 1.2 % (ref 0–8)
ERYTHROCYTE [DISTWIDTH] IN BLOOD BY AUTOMATED COUNT: 12.8 % (ref 11.5–14.5)
EST. GFR  (AFRICAN AMERICAN): >60 ML/MIN/1.73 M^2
EST. GFR  (NON AFRICAN AMERICAN): >60 ML/MIN/1.73 M^2
GLUCOSE SERPL-MCNC: 87 MG/DL (ref 70–110)
HCT VFR BLD AUTO: 43.2 % (ref 40–54)
HGB BLD-MCNC: 14.6 G/DL (ref 14–18)
IMM GRANULOCYTES # BLD AUTO: 0.02 K/UL (ref 0–0.04)
IMM GRANULOCYTES NFR BLD AUTO: 0.2 % (ref 0–0.5)
LIPASE SERPL-CCNC: 17 U/L (ref 4–60)
LYMPHOCYTES # BLD AUTO: 2.1 K/UL (ref 1–4.8)
LYMPHOCYTES NFR BLD: 23.2 % (ref 18–48)
MCH RBC QN AUTO: 30.7 PG (ref 27–31)
MCHC RBC AUTO-ENTMCNC: 33.8 G/DL (ref 32–36)
MCV RBC AUTO: 91 FL (ref 82–98)
MONOCYTES # BLD AUTO: 0.9 K/UL (ref 0.3–1)
MONOCYTES NFR BLD: 9.6 % (ref 4–15)
NEUTROPHILS # BLD AUTO: 5.8 K/UL (ref 1.8–7.7)
NEUTROPHILS NFR BLD: 65.1 % (ref 38–73)
NRBC BLD-RTO: 0 /100 WBC
OPIATES UR QL SCN: NEGATIVE
PCP UR QL SCN>25 NG/ML: NEGATIVE
PLATELET # BLD AUTO: 336 K/UL (ref 150–350)
PMV BLD AUTO: 10.6 FL (ref 9.2–12.9)
POTASSIUM SERPL-SCNC: 3.9 MMOL/L (ref 3.5–5.1)
PROT SERPL-MCNC: 7.8 G/DL (ref 6–8.4)
RBC # BLD AUTO: 4.76 M/UL (ref 4.6–6.2)
SARS-COV-2 RDRP RESP QL NAA+PROBE: NEGATIVE
SODIUM SERPL-SCNC: 136 MMOL/L (ref 136–145)
TOXICOLOGY INFORMATION: NORMAL
TROPONIN I SERPL DL<=0.01 NG/ML-MCNC: <0.03 NG/ML
WBC # BLD AUTO: 8.89 K/UL (ref 3.9–12.7)

## 2021-02-22 PROCEDURE — 93005 ELECTROCARDIOGRAM TRACING: CPT | Performed by: SPECIALIST

## 2021-02-22 PROCEDURE — U0002 COVID-19 LAB TEST NON-CDC: HCPCS

## 2021-02-22 PROCEDURE — 99285 EMERGENCY DEPT VISIT HI MDM: CPT | Mod: 25

## 2021-02-22 PROCEDURE — 93010 EKG 12-LEAD: ICD-10-PCS | Mod: ,,, | Performed by: SPECIALIST

## 2021-02-22 PROCEDURE — 25000003 PHARM REV CODE 250: Performed by: EMERGENCY MEDICINE

## 2021-02-22 PROCEDURE — 36415 COLL VENOUS BLD VENIPUNCTURE: CPT

## 2021-02-22 PROCEDURE — 93010 ELECTROCARDIOGRAM REPORT: CPT | Mod: ,,, | Performed by: SPECIALIST

## 2021-02-22 PROCEDURE — 85025 COMPLETE CBC W/AUTO DIFF WBC: CPT

## 2021-02-22 PROCEDURE — 83880 ASSAY OF NATRIURETIC PEPTIDE: CPT

## 2021-02-22 PROCEDURE — 80053 COMPREHEN METABOLIC PANEL: CPT

## 2021-02-22 PROCEDURE — 83690 ASSAY OF LIPASE: CPT

## 2021-02-22 PROCEDURE — 80307 DRUG TEST PRSMV CHEM ANLYZR: CPT

## 2021-02-22 PROCEDURE — 84484 ASSAY OF TROPONIN QUANT: CPT

## 2021-02-22 RX ORDER — PANTOPRAZOLE SODIUM 20 MG/1
20 TABLET, DELAYED RELEASE ORAL DAILY
Qty: 30 TABLET | Refills: 0 | Status: SHIPPED | OUTPATIENT
Start: 2021-02-22 | End: 2021-10-19

## 2021-02-22 RX ADMIN — DICYCLOMINE HYDROCHLORIDE 50 ML: 10 SOLUTION ORAL at 04:02

## 2021-02-25 ENCOUNTER — OFFICE VISIT (OUTPATIENT)
Dept: ORTHOPEDICS | Facility: CLINIC | Age: 31
End: 2021-02-25
Payer: OTHER MISCELLANEOUS

## 2021-02-25 VITALS — HEIGHT: 72 IN | BODY MASS INDEX: 35.62 KG/M2 | RESPIRATION RATE: 17 BRPM | WEIGHT: 263 LBS

## 2021-02-25 DIAGNOSIS — Z98.890 STATUS POST ARTHROSCOPY OF LEFT KNEE: ICD-10-CM

## 2021-02-25 DIAGNOSIS — S83.242D ACUTE MEDIAL MENISCUS TEAR OF LEFT KNEE, SUBSEQUENT ENCOUNTER: Primary | ICD-10-CM

## 2021-02-25 PROCEDURE — 99213 PR OFFICE/OUTPT VISIT, EST, LEVL III, 20-29 MIN: ICD-10-PCS | Mod: S$GLB,,, | Performed by: ORTHOPAEDIC SURGERY

## 2021-02-25 PROCEDURE — 99999 PR PBB SHADOW E&M-EST. PATIENT-LVL III: CPT | Mod: PBBFAC,,, | Performed by: ORTHOPAEDIC SURGERY

## 2021-02-25 PROCEDURE — 99213 OFFICE O/P EST LOW 20 MIN: CPT | Mod: S$GLB,,, | Performed by: ORTHOPAEDIC SURGERY

## 2021-02-25 PROCEDURE — 99999 PR PBB SHADOW E&M-EST. PATIENT-LVL III: ICD-10-PCS | Mod: PBBFAC,,, | Performed by: ORTHOPAEDIC SURGERY

## 2021-02-26 DIAGNOSIS — Z98.890 STATUS POST ARTHROSCOPY OF LEFT KNEE: Primary | ICD-10-CM

## 2021-03-18 ENCOUNTER — TELEPHONE (OUTPATIENT)
Dept: ORTHOPEDICS | Facility: CLINIC | Age: 31
End: 2021-03-18

## 2021-08-17 ENCOUNTER — HOSPITAL ENCOUNTER (EMERGENCY)
Facility: HOSPITAL | Age: 31
Discharge: HOME | End: 2021-08-17
Attending: EMERGENCY MEDICINE
Payer: MEDICAID

## 2021-08-17 VITALS
HEART RATE: 72 BPM | DIASTOLIC BLOOD PRESSURE: 82 MMHG | WEIGHT: 283 LBS | BODY MASS INDEX: 38.33 KG/M2 | OXYGEN SATURATION: 97 % | TEMPERATURE: 98 F | HEIGHT: 72 IN | RESPIRATION RATE: 18 BRPM | SYSTOLIC BLOOD PRESSURE: 141 MMHG

## 2021-08-17 DIAGNOSIS — T63.441A BEE STING, ACCIDENTAL OR UNINTENTIONAL, INITIAL ENCOUNTER: Primary | ICD-10-CM

## 2021-08-17 PROCEDURE — 99283 EMERGENCY DEPT VISIT LOW MDM: CPT

## 2021-08-17 PROCEDURE — 25000003 PHARM REV CODE 250: Performed by: PHYSICIAN ASSISTANT

## 2021-08-17 PROCEDURE — 63600175 PHARM REV CODE 636 W HCPCS: Performed by: PHYSICIAN ASSISTANT

## 2021-08-17 RX ORDER — DIPHENHYDRAMINE HCL 25 MG
50 CAPSULE ORAL
Status: COMPLETED | OUTPATIENT
Start: 2021-08-17 | End: 2021-08-17

## 2021-08-17 RX ORDER — PREDNISONE 20 MG/1
60 TABLET ORAL
Status: COMPLETED | OUTPATIENT
Start: 2021-08-17 | End: 2021-08-17

## 2021-08-17 RX ORDER — FAMOTIDINE 20 MG/1
20 TABLET, FILM COATED ORAL 2 TIMES DAILY
Status: DISCONTINUED | OUTPATIENT
Start: 2021-08-17 | End: 2021-08-17 | Stop reason: HOSPADM

## 2021-08-17 RX ADMIN — DIPHENHYDRAMINE HYDROCHLORIDE 50 MG: 25 CAPSULE ORAL at 07:08

## 2021-08-17 RX ADMIN — PREDNISONE 60 MG: 20 TABLET ORAL at 07:08

## 2021-10-19 ENCOUNTER — OFFICE VISIT (OUTPATIENT)
Dept: FAMILY MEDICINE | Facility: CLINIC | Age: 31
End: 2021-10-19
Payer: MEDICAID

## 2021-10-19 VITALS
SYSTOLIC BLOOD PRESSURE: 122 MMHG | WEIGHT: 301 LBS | OXYGEN SATURATION: 100 % | HEART RATE: 78 BPM | RESPIRATION RATE: 18 BRPM | DIASTOLIC BLOOD PRESSURE: 74 MMHG | HEIGHT: 72 IN | BODY MASS INDEX: 40.77 KG/M2

## 2021-10-19 DIAGNOSIS — F90.9 ATTENTION DEFICIT HYPERACTIVITY DISORDER (ADHD), UNSPECIFIED ADHD TYPE: ICD-10-CM

## 2021-10-19 DIAGNOSIS — Z76.89 ENCOUNTER TO ESTABLISH CARE: Primary | ICD-10-CM

## 2021-10-19 DIAGNOSIS — Z80.8 FAMILY HISTORY OF THYROID CANCER: ICD-10-CM

## 2021-10-19 DIAGNOSIS — Z23 FLU VACCINE NEED: ICD-10-CM

## 2021-10-19 DIAGNOSIS — Z20.2 POTENTIAL EXPOSURE TO STD: ICD-10-CM

## 2021-10-19 DIAGNOSIS — Z11.4 SCREENING FOR HIV (HUMAN IMMUNODEFICIENCY VIRUS): ICD-10-CM

## 2021-10-19 DIAGNOSIS — F32.A ANXIETY AND DEPRESSION: ICD-10-CM

## 2021-10-19 DIAGNOSIS — R68.89 TEMPERATURE INTOLERANCE: ICD-10-CM

## 2021-10-19 DIAGNOSIS — Z23 NEED FOR DIPHTHERIA-TETANUS-PERTUSSIS (TDAP) VACCINE: ICD-10-CM

## 2021-10-19 DIAGNOSIS — F43.10 PTSD (POST-TRAUMATIC STRESS DISORDER): ICD-10-CM

## 2021-10-19 DIAGNOSIS — Z11.59 NEED FOR HEPATITIS C SCREENING TEST: ICD-10-CM

## 2021-10-19 DIAGNOSIS — F41.9 ANXIETY AND DEPRESSION: ICD-10-CM

## 2021-10-19 PROCEDURE — 87491 CHLMYD TRACH DNA AMP PROBE: CPT | Performed by: FAMILY MEDICINE

## 2021-10-19 PROCEDURE — 99214 OFFICE O/P EST MOD 30 MIN: CPT | Performed by: FAMILY MEDICINE

## 2021-10-19 PROCEDURE — 99203 OFFICE O/P NEW LOW 30 MIN: CPT | Mod: S$PBB,,, | Performed by: FAMILY MEDICINE

## 2021-10-19 PROCEDURE — 87591 N.GONORRHOEAE DNA AMP PROB: CPT | Performed by: FAMILY MEDICINE

## 2021-10-19 PROCEDURE — 99203 PR OFFICE/OUTPT VISIT, NEW, LEVL III, 30-44 MIN: ICD-10-PCS | Mod: S$PBB,,, | Performed by: FAMILY MEDICINE

## 2021-10-21 LAB
CHLAMYDIA, AMPLIFIED DNA: NEGATIVE
N GONORRHOEAE, AMPLIFIED DNA: NEGATIVE

## 2021-10-25 ENCOUNTER — TELEPHONE (OUTPATIENT)
Dept: FAMILY MEDICINE | Facility: CLINIC | Age: 31
End: 2021-10-25
Payer: MEDICAID

## 2021-10-27 ENCOUNTER — HOSPITAL ENCOUNTER (EMERGENCY)
Facility: HOSPITAL | Age: 31
Discharge: HOME | End: 2021-10-27
Attending: EMERGENCY MEDICINE
Payer: MEDICAID

## 2021-10-27 VITALS
OXYGEN SATURATION: 99 % | DIASTOLIC BLOOD PRESSURE: 69 MMHG | HEIGHT: 72 IN | WEIGHT: 298 LBS | HEART RATE: 85 BPM | BODY MASS INDEX: 40.36 KG/M2 | RESPIRATION RATE: 20 BRPM | SYSTOLIC BLOOD PRESSURE: 130 MMHG | TEMPERATURE: 98 F
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
## 2021-10-27 DIAGNOSIS — S80.12XA CONTUSION OF LEFT LOWER LEG, INITIAL ENCOUNTER: ICD-10-CM

## 2021-10-27 DIAGNOSIS — S80.812A ABRASION OF LEFT LOWER EXTREMITY, INITIAL ENCOUNTER: Primary | ICD-10-CM

## 2021-10-27 PROCEDURE — 99284 EMERGENCY DEPT VISIT MOD MDM: CPT | Mod: 25

## 2021-10-27 PROCEDURE — 63600175 PHARM REV CODE 636 W HCPCS: Performed by: EMERGENCY MEDICINE

## 2021-10-27 PROCEDURE — 96372 THER/PROPH/DIAG INJ SC/IM: CPT

## 2021-10-27 RX ORDER — KETOROLAC TROMETHAMINE 30 MG/ML
30 INJECTION, SOLUTION INTRAMUSCULAR; INTRAVENOUS
Status: COMPLETED | OUTPATIENT
Start: 2021-10-27 | End: 2021-10-27

## 2021-10-27 RX ORDER — IBUPROFEN 800 MG/1
800 TABLET ORAL EVERY 6 HOURS PRN
Qty: 20 TABLET | Refills: 0 | Status: SHIPPED | OUTPATIENT
Start: 2021-10-27 | End: 2022-11-14

## 2021-10-27 RX ADMIN — KETOROLAC TROMETHAMINE 30 MG: 30 INJECTION, SOLUTION INTRAMUSCULAR at 08:10

## 2022-10-15 ENCOUNTER — LAB VISIT (OUTPATIENT)
Dept: LAB | Facility: HOSPITAL | Age: 32
End: 2022-10-15
Attending: FAMILY MEDICINE
Payer: MEDICAID

## 2022-10-15 DIAGNOSIS — Z11.4 SCREENING FOR HIV (HUMAN IMMUNODEFICIENCY VIRUS): ICD-10-CM

## 2022-10-15 DIAGNOSIS — R68.89 TEMPERATURE INTOLERANCE: ICD-10-CM

## 2022-10-15 DIAGNOSIS — Z80.8 FAMILY HISTORY OF THYROID CANCER: ICD-10-CM

## 2022-10-15 DIAGNOSIS — Z11.59 NEED FOR HEPATITIS C SCREENING TEST: ICD-10-CM

## 2022-10-15 LAB
CHOLEST SERPL-MCNC: 151 MG/DL (ref 120–199)
CHOLEST/HDLC SERPL: 3.5 {RATIO} (ref 2–5)
ESTIMATED AVG GLUCOSE: 114 MG/DL (ref 68–131)
HBA1C MFR BLD: 5.6 % (ref 4.5–6.2)
HDLC SERPL-MCNC: 43 MG/DL (ref 40–75)
HDLC SERPL: 28.5 % (ref 20–50)
LDLC SERPL CALC-MCNC: 93.4 MG/DL (ref 63–159)
NONHDLC SERPL-MCNC: 108 MG/DL
TRIGL SERPL-MCNC: 73 MG/DL (ref 30–150)
TSH SERPL DL<=0.005 MIU/L-ACNC: 1.58 UIU/ML (ref 0.34–5.6)

## 2022-10-15 PROCEDURE — 86803 HEPATITIS C AB TEST: CPT | Performed by: FAMILY MEDICINE

## 2022-10-15 PROCEDURE — 83036 HEMOGLOBIN GLYCOSYLATED A1C: CPT | Performed by: FAMILY MEDICINE

## 2022-10-15 PROCEDURE — 84443 ASSAY THYROID STIM HORMONE: CPT | Performed by: FAMILY MEDICINE

## 2022-10-15 PROCEDURE — 36415 COLL VENOUS BLD VENIPUNCTURE: CPT | Performed by: FAMILY MEDICINE

## 2022-10-15 PROCEDURE — 87389 HIV-1 AG W/HIV-1&-2 AB AG IA: CPT | Performed by: FAMILY MEDICINE

## 2022-10-15 PROCEDURE — 80061 LIPID PANEL: CPT | Performed by: FAMILY MEDICINE

## 2022-10-18 LAB
HCV AB S/CO SERPL IA: <0.1 S/CO RATIO (ref 0–0.9)
HIV 1+2 AB+HIV1 P24 AG SERPL QL IA: NON REACTIVE

## 2022-11-14 ENCOUNTER — OFFICE VISIT (OUTPATIENT)
Dept: FAMILY MEDICINE | Facility: CLINIC | Age: 32
End: 2022-11-14
Payer: MEDICAID

## 2022-11-14 VITALS
HEART RATE: 72 BPM | WEIGHT: 315 LBS | DIASTOLIC BLOOD PRESSURE: 70 MMHG | RESPIRATION RATE: 18 BRPM | BODY MASS INDEX: 42.66 KG/M2 | OXYGEN SATURATION: 98 % | SYSTOLIC BLOOD PRESSURE: 124 MMHG | HEIGHT: 72 IN

## 2022-11-14 DIAGNOSIS — Z87.891 FORMER SMOKER: ICD-10-CM

## 2022-11-14 DIAGNOSIS — Z00.00 ANNUAL PHYSICAL EXAM: Primary | ICD-10-CM

## 2022-11-14 DIAGNOSIS — E66.01 CLASS 3 SEVERE OBESITY DUE TO EXCESS CALORIES WITHOUT SERIOUS COMORBIDITY WITH BODY MASS INDEX (BMI) OF 40.0 TO 44.9 IN ADULT: ICD-10-CM

## 2022-11-14 PROBLEM — R68.89 TEMPERATURE INTOLERANCE: Status: RESOLVED | Noted: 2021-10-19 | Resolved: 2022-11-14

## 2022-11-14 PROCEDURE — 3078F DIAST BP <80 MM HG: CPT | Mod: CPTII,,, | Performed by: FAMILY MEDICINE

## 2022-11-14 PROCEDURE — 3044F PR MOST RECENT HEMOGLOBIN A1C LEVEL <7.0%: ICD-10-PCS | Mod: CPTII,,, | Performed by: FAMILY MEDICINE

## 2022-11-14 PROCEDURE — 1159F MED LIST DOCD IN RCRD: CPT | Mod: CPTII,,, | Performed by: FAMILY MEDICINE

## 2022-11-14 PROCEDURE — 3044F HG A1C LEVEL LT 7.0%: CPT | Mod: CPTII,,, | Performed by: FAMILY MEDICINE

## 2022-11-14 PROCEDURE — 99395 PREV VISIT EST AGE 18-39: CPT | Mod: S$PBB,,, | Performed by: FAMILY MEDICINE

## 2022-11-14 PROCEDURE — 3074F SYST BP LT 130 MM HG: CPT | Mod: CPTII,,, | Performed by: FAMILY MEDICINE

## 2022-11-14 PROCEDURE — 99214 OFFICE O/P EST MOD 30 MIN: CPT | Performed by: FAMILY MEDICINE

## 2022-11-14 PROCEDURE — 3008F BODY MASS INDEX DOCD: CPT | Mod: CPTII,,, | Performed by: FAMILY MEDICINE

## 2022-11-14 PROCEDURE — 3078F PR MOST RECENT DIASTOLIC BLOOD PRESSURE < 80 MM HG: ICD-10-PCS | Mod: CPTII,,, | Performed by: FAMILY MEDICINE

## 2022-11-14 PROCEDURE — 1159F PR MEDICATION LIST DOCUMENTED IN MEDICAL RECORD: ICD-10-PCS | Mod: CPTII,,, | Performed by: FAMILY MEDICINE

## 2022-11-14 PROCEDURE — 3074F PR MOST RECENT SYSTOLIC BLOOD PRESSURE < 130 MM HG: ICD-10-PCS | Mod: CPTII,,, | Performed by: FAMILY MEDICINE

## 2022-11-14 PROCEDURE — 99395 PR PREVENTIVE VISIT,EST,18-39: ICD-10-PCS | Mod: S$PBB,,, | Performed by: FAMILY MEDICINE

## 2022-11-14 PROCEDURE — 3008F PR BODY MASS INDEX (BMI) DOCUMENTED: ICD-10-PCS | Mod: CPTII,,, | Performed by: FAMILY MEDICINE

## 2022-11-14 NOTE — PROGRESS NOTES
SUBJECTIVE:   HPI: Nabil Cristobal  is a 32 y.o. male who presents for annual physical.    HPI  Nabil Cristobal is a 32 y.o. M who comes in for annual physical. The patient has a history of Asthma, Anxiety/Depression, Nicotine dependence, ADHD, PTSD, and mild Tourettes, and has no acute complaints today.    Changes in personal or family hx since last physical: Has quit smoking cigarettes. Otherwise, none.    Last eye exam: 2+ years  Last dental: 3 years. Does not have dental insurance but has gone to [facility] before.    Recent labs: Lipids, A1c, TSH wnl. HIV and HCV neg.    HM:   Due for flu (declines), TDaP (reports got within past 10y when hurt on the job), Pneumococcal (declines)    Current Outpatient Medications on File Prior to Visit   Medication Sig Dispense Refill    [DISCONTINUED] ibuprofen (ADVIL,MOTRIN) 800 MG tablet Take 1 tablet (800 mg total) by mouth every 6 (six) hours as needed for Pain. (Patient not taking: Reported on 11/14/2022) 20 tablet 0     Current Facility-Administered Medications on File Prior to Visit   Medication Dose Route Frequency Provider Last Rate Last Admin    [DISCONTINUED] electrolyte-S (ISOLYTE)   Intravenous Continuous Moreno Underwood MD   Stopped at 07/17/20 1447    [DISCONTINUED] lactated ringers infusion   Intravenous Continuous Moreno Underwood MD        [DISCONTINUED] lidocaine (PF) 10 mg/ml (1%) injection 5 mg  0.5 mL Intradermal Once Moreno Underwood MD           Past Medical History:   Diagnosis Date    ADHD (attention deficit hyperactivity disorder)     Anxiety     Asthma     Depression     PTSD (post-traumatic stress disorder)     Rage        @[email or web address]    Past Surgical History:   Procedure Laterality Date    ADENOIDECTOMY      KNEE ARTHROSCOPY W/ MENISCECTOMY Left 7/17/2020    Procedure: ARTHROSCOPY, KNEE, WITH MENISCECTOMY;  Surgeon: Harinder Mock MD;  Location: [facility];  Service: Orthopedics;  Laterality: Left;  Medial Meniscectomy    SINUS  SURGERY      TONSILLECTOMY       Alcohol History:  reports current alcohol use.  Tobacco History:  reports that he has quit smoking. His smoking use included vaping with nicotine and cigarettes. He smoked an average of 1 pack per day. He has never used smokeless tobacco.  Drug History:  reports current drug use. Drug: Marijuana.    Family History   Problem Relation Age of Onset    Diabetes type II Mother     Depression Mother     Mental illness Maternal Uncle     Thyroid cancer Sister     Diabetes type II Maternal Grandmother     Diabetes type II Maternal Grandfather     Stroke Maternal Grandfather         In his 50s    Heart attacks under age 50 Maternal Grandfather        The patient has no Health Maintenance topics of status Not Due  There is no immunization history for the selected administration types on file for this patient.    Review of patient's allergies indicates:   Allergen Reactions    Bee pollens Anaphylaxis    Pcn [penicillins] Anaphylaxis    Plaster of missy [bulk chemical] Hives    Codeine      Rash       No current outpatient medications on file.  No current facility-administered medications for this visit.    Review of Systems   Constitutional:  Negative for chills and fever.   HENT:  Negative for ear pain and hearing loss.    Eyes:  Negative for visual disturbance.   Respiratory:  Negative for shortness of breath and wheezing.    Cardiovascular:  Negative for chest pain, palpitations and leg swelling.   Gastrointestinal:  Negative for abdominal pain, constipation, diarrhea, nausea and vomiting.   Genitourinary:  Negative for difficulty urinating.   Musculoskeletal:  Negative for arthralgias and myalgias.   Psychiatric/Behavioral:  Negative for dysphoric mood.     OBJECTIVE:      Vitals:    11/14/22 0950   BP: 124/70   BP Location: Left arm   Patient Position: Sitting   BP Method: Large (Manual)   Pulse: 72   Resp: 18   SpO2: 98%   Weight: (!) 147 kg (324 lb)   Height: 6' (1.829 m)     Physical  Exam  Vitals reviewed.   Constitutional:       General: He is not in acute distress.     Appearance: He is obese. He is not ill-appearing.   HENT:      Head: Normocephalic and atraumatic.      Nose: Nose normal.      Mouth/Throat:      Mouth: Mucous membranes are moist.      Pharynx: Oropharynx is clear.   Eyes:      Extraocular Movements: Extraocular movements intact.      Conjunctiva/sclera: Conjunctivae normal.      Pupils: Pupils are equal, round, and reactive to light.   Cardiovascular:      Rate and Rhythm: Normal rate and regular rhythm.      Pulses: Normal pulses.      Heart sounds: Normal heart sounds.   Pulmonary:      Effort: Pulmonary effort is normal.      Breath sounds: Normal breath sounds.   Abdominal:      General: There is no distension.      Palpations: Abdomen is soft.      Tenderness: There is no abdominal tenderness. There is no guarding.   Musculoskeletal:         General: Normal range of motion.      Cervical back: Neck supple.      Right lower leg: No edema.      Left lower leg: No edema.   Lymphadenopathy:      Cervical: No cervical adenopathy.   Skin:     General: Skin is warm and dry.   Neurological:      General: No focal deficit present.      Mental Status: He is alert and oriented to person, place, and time.      Motor: No weakness.   Psychiatric:         Mood and Affect: Mood normal.         Behavior: Behavior normal.      Assessment:       1. Annual physical exam        Plan:       Annual physical exam    Discussed recent labs, all wnl. Began conversation around excess weight; patient precontemplative re: weight loss, but will alert us when ready to further discuss. Congratulated on smoking cessation. Counseled on age and gender appropriate medical preventative services, including cancer screenings, immunizations, overall nutritional health, and an overall push towards maintaining a vigorous and active lifestyle. Pt declines immunizations.    Follow up in about 1 year (around  11/14/2023) for Annual Physical.          11/14/2022 Janeth Solomon M.D.

## 2023-06-30 ENCOUNTER — HOSPITAL ENCOUNTER (EMERGENCY)
Facility: HOSPITAL | Age: 33
Discharge: HOME | End: 2023-06-30
Attending: EMERGENCY MEDICINE
Payer: MEDICAID

## 2023-06-30 VITALS
OXYGEN SATURATION: 96 % | DIASTOLIC BLOOD PRESSURE: 69 MMHG | BODY MASS INDEX: 35.62 KG/M2 | SYSTOLIC BLOOD PRESSURE: 121 MMHG | HEIGHT: 72 IN | TEMPERATURE: 99 F | RESPIRATION RATE: 17 BRPM | HEART RATE: 54 BPM | WEIGHT: 263 LBS

## 2023-06-30 DIAGNOSIS — T63.481A HYMENOPTERA REACTION: Primary | ICD-10-CM

## 2023-06-30 PROCEDURE — 96375 TX/PRO/DX INJ NEW DRUG ADDON: CPT

## 2023-06-30 PROCEDURE — 25000003 PHARM REV CODE 250: Performed by: EMERGENCY MEDICINE

## 2023-06-30 PROCEDURE — 63600175 PHARM REV CODE 636 W HCPCS: Performed by: EMERGENCY MEDICINE

## 2023-06-30 PROCEDURE — 96374 THER/PROPH/DIAG INJ IV PUSH: CPT

## 2023-06-30 PROCEDURE — 99284 EMERGENCY DEPT VISIT MOD MDM: CPT | Mod: 25

## 2023-06-30 RX ORDER — EPINEPHRINE 0.3 MG/.3ML
1 INJECTION SUBCUTANEOUS
Qty: 1 EACH | Refills: 1 | Status: SHIPPED | OUTPATIENT
Start: 2023-06-30 | End: 2024-06-29

## 2023-06-30 RX ORDER — EPINEPHRINE 0.3 MG/.3ML
0.3 INJECTION SUBCUTANEOUS
Status: DISCONTINUED | OUTPATIENT
Start: 2023-06-30 | End: 2023-06-30

## 2023-06-30 RX ORDER — ONDANSETRON 2 MG/ML
4 INJECTION INTRAMUSCULAR; INTRAVENOUS
Status: COMPLETED | OUTPATIENT
Start: 2023-06-30 | End: 2023-06-30

## 2023-06-30 RX ORDER — DIPHENHYDRAMINE HYDROCHLORIDE 50 MG/ML
50 INJECTION INTRAMUSCULAR; INTRAVENOUS
Status: COMPLETED | OUTPATIENT
Start: 2023-06-30 | End: 2023-06-30

## 2023-06-30 RX ORDER — FAMOTIDINE 10 MG/ML
20 INJECTION INTRAVENOUS
Status: COMPLETED | OUTPATIENT
Start: 2023-06-30 | End: 2023-06-30

## 2023-06-30 RX ORDER — METHYLPREDNISOLONE SOD SUCC 125 MG
125 VIAL (EA) INJECTION
Status: COMPLETED | OUTPATIENT
Start: 2023-06-30 | End: 2023-06-30

## 2023-06-30 RX ORDER — ONDANSETRON 4 MG/1
4 TABLET, FILM COATED ORAL EVERY 8 HOURS PRN
Qty: 12 TABLET | Refills: 0 | Status: SHIPPED | OUTPATIENT
Start: 2023-06-30

## 2023-06-30 RX ADMIN — FAMOTIDINE 20 MG: 10 INJECTION INTRAVENOUS at 09:06

## 2023-06-30 RX ADMIN — METHYLPREDNISOLONE SODIUM SUCCINATE 125 MG: 125 INJECTION, POWDER, FOR SOLUTION INTRAMUSCULAR; INTRAVENOUS at 09:06

## 2023-06-30 RX ADMIN — DIPHENHYDRAMINE HYDROCHLORIDE 50 MG: 50 INJECTION INTRAMUSCULAR; INTRAVENOUS at 09:06

## 2023-06-30 RX ADMIN — ONDANSETRON 4 MG: 2 INJECTION INTRAMUSCULAR; INTRAVENOUS at 09:06

## 2023-06-30 NOTE — ED PROVIDER NOTES
Encounter Date: 6/30/2023       History     Chief Complaint   Patient presents with    Allergic Reaction     Bee sting to lower left leg. Pt states that he is allergic to bees but has not had a reaction since he was a child. At this time no hives or oral swelling are present. Localized swelling noted around bee sting.       Patient reports he was stung by a bee or wasp shortly prior to arrival.  Patient arrives with EMS.  No treatment in route.  Patient no chest pain or shortness of breath.  No oral swelling.  He does report some mild nausea.    Review of patient's allergies indicates:   Allergen Reactions    Bee pollens Anaphylaxis    Pcn [penicillins] Anaphylaxis    Plaster of missy [bulk chemical] Hives    Codeine      Rash       Past Medical History:   Diagnosis Date    ADHD (attention deficit hyperactivity disorder)     Anxiety     Asthma     Depression     PTSD (post-traumatic stress disorder)     Rage      Past Surgical History:   Procedure Laterality Date    ADENOIDECTOMY      KNEE ARTHROSCOPY W/ MENISCECTOMY Left 7/17/2020    Procedure: ARTHROSCOPY, KNEE, WITH MENISCECTOMY;  Surgeon: Harinder Mock MD;  Location: [facility];  Service: Orthopedics;  Laterality: Left;  Medial Meniscectomy    SINUS SURGERY      TONSILLECTOMY       Family History   Problem Relation Age of Onset    Diabetes type II Mother     Depression Mother     Mental illness Maternal Uncle     Thyroid cancer Sister     Diabetes type II Maternal Grandmother     Diabetes type II Maternal Grandfather     Stroke Maternal Grandfather         In his 50s    Heart attacks under age 50 Maternal Grandfather      Social History     Tobacco Use    Smoking status: Former     Packs/day: 1.00     Types: Vaping with nicotine, Cigarettes    Smokeless tobacco: Never   Substance Use Topics    Alcohol use: Yes    Drug use: Yes     Types: Marijuana     Review of Systems   Constitutional:  Negative for chills and fever.   HENT:  Negative for sore throat.     Eyes:  Negative for photophobia and visual disturbance.   Respiratory:  Negative for shortness of breath.    Cardiovascular:  Negative for chest pain.   Gastrointestinal:  Positive for nausea. Negative for abdominal pain and vomiting.   Genitourinary:  Negative for dysuria.   Musculoskeletal:  Negative for joint swelling.   Skin:  Positive for rash.   Neurological:  Negative for weakness and headaches.   Psychiatric/Behavioral:  Negative for confusion.      Physical Exam     Initial Vitals [06/30/23 0929]   BP Pulse Resp Temp SpO2   (!) 152/66 68 20 98.9 °F (37.2 °C) 97 %      MAP       --         Physical Exam    Nursing note and vitals reviewed.  Constitutional: He is not diaphoretic. No distress.   HENT:   Head: Normocephalic and atraumatic.   No oral swelling,  no stridor   Eyes: Conjunctivae are normal.   Neck:   Normal range of motion.  Cardiovascular:  Regular rhythm.           Pulmonary/Chest: Breath sounds normal.   Abdominal: Abdomen is soft. Bowel sounds are normal. There is no abdominal tenderness.   Musculoskeletal:         General: Normal range of motion.      Cervical back: Normal range of motion.      Comments: All extremities are neurovascular intact with no significant swelling     Neurological: He is alert. He has normal strength. No cranial nerve deficit or sensory deficit.   Skin:   Left lower anterior shin with small area approximally 2 x 1 cm area of erythema.  No significant surrounding urticaria.   Psychiatric: He has a normal mood and affect.       ED Course   Procedures  Labs Reviewed - No data to display       Imaging Results    None          Medications   diphenhydrAMINE injection 50 mg (50 mg Intravenous Given 6/30/23 0939)   methylPREDNISolone sodium succinate injection 125 mg (125 mg Intravenous Given 6/30/23 0939)   famotidine (PF) injection 20 mg (20 mg Intravenous Given 6/30/23 0939)   ondansetron injection 4 mg (4 mg Intravenous Given 6/30/23 0939)     Medical Decision Making:    History:   Old Medical Records: I decided to obtain old medical records.  ED Management:  Patient presents after Hymenoptera envenomation.  Patient has a mild localized reaction.  Systemic symptom is only nausea.  No shortness of breath.  Patient treated for allergic reaction with Benadryl, Pepcid and Solu-Medrol.  Patient observed for proximally 1 hour.  No progression of localized lesion.  Patient currently with no systemic symptoms.  Patient is stable for discharge home.  Patient encouraged to take over-the-counter antihistamine.  Will refer to allergist.  Will give EpiPen.  Given only localized reaction currently will not continue corticosteroids.                        Clinical Impression:   Final diagnoses:  [T63.481A] Hymenoptera reaction (Primary)        ED Disposition Condition    Discharge Stable          ED Prescriptions       Medication Sig Dispense Start Date End Date Auth. Provider    EPINEPHrine (EPIPEN) 0.3 mg/0.3 mL AtIn Inject 0.3 mLs (0.3 mg total) into the muscle as needed. 1 each 6/30/2023 6/29/2024 yRan Clemens MD          Follow-up Information       Follow up With Specialties Details Why Contact Info Additional Information    [facility] - Emergency Dept Emergency Medicine  If symptoms worsen [address] [id number] [phone] 1st floor    Kristin Salcido MD Allergy, Allergy and Immunology, Immunology, Pediatric Allergy, Pediatric Immunology Schedule an appointment as soon as possible for a visit   [address]  [address]  [place] [address]  [phone]                Ryan Clemens MD  06/30/23 8242

## 2023-06-30 NOTE — DISCHARGE INSTRUCTIONS
Take over-the-counter antihistamine on a scheduled basis for the next 3-4 days such as Zyrtec or Claritin.  You can take Benadryl as well

## 2023-10-30 NOTE — PROGRESS NOTES
"  Physical Therapy Daily Treatment Note     Name: Nabil Cristobal  Clinic Number: [id number]    Therapy Diagnosis:   Encounter Diagnoses   Name Primary?    Weakness of both lower extremities     Chronic pain of both knees      Physician: Stanley Browning MD    Visit Date: 10/7/2019  Physician Orders: PT Eval and Treat  Medical Diagnosis from Referral: Closed nondisplaced avulsion fracture of tuberosity of right calcaneus with routine healing, subsequent encounter  Evaluation Date: 9/19/2019  Authorization Period Expiration: 10/12/2019  Plan of Care Expiration: 11/1/2019  Visit # / Visits authorized: 5/12     Time In: 1029 (pt late arrival d/t flat tire)  Time Out: 1100  Total Billable Time: 28 minutes     Precautions: Standard    Subjective     Pt reports that he had significant soreness following previous session that only fully resolved yesterday. Pt reports that he is feeling good on this date.    He was compliant with home exercise program.  Response to previous treatment: moderate soreness, no adverse effects  Functional change: continued improvements in tolerance to ambulation and long distances    Pain: 0/10  Location: bilateral ankles and feet     Objective     Nabil received therapeutic exercises to develop strength, endurance and flexibility for 28 minutes including:  - Stationary Bike x 5 min (emphasis on LE muscular endurance; took subjective)  - Supine HS stretch w/ strap x60" each NP (time)  - Slant board gastroc stretch 2x60"  - TB lateral shuffle [id number] (red)  - TB monster walk [id number] (red) NP (time)  - TRX assisted reverse lunge 3x6 each  - KB deadlift 3x8 (35#)  - Sled push [id number] (50#) NP (time)  - Deficit eccentric heel raise (2 up/1 down) 3x10 each (bilat UE support)  - KB seated heel raise 3x15 (25# each) NP (time)    Home Exercises Provided and Patient Education Provided     Education provided:   - Timeline for improvement, adaptation to exercises w/ decreased subsequent " Patient called the after-hours on-call service in need of a medication refill. Prescription was submitted. DOMS    Written Home Exercises Provided: Patient instructed to cont prior HEP.  Exercises were reviewed and Nabil was able to demonstrate them prior to the end of the session.  Nabil demonstrated good  understanding of the education provided.     See EMR under Patient Instructions for exercises provided prior visit.    Assessment     Pt w/ good progress on this date compared to previous session, w/ improved tolerance to interventions and decreased rest requirements between sets. Pt w/ improved bilat LE strength on this date as evidenced by decreased RPE rating during LE strengthening interventions throughout. Pt still w/ notable deficits in activity tolerance and fatigue resistance, but improving w/ therapy at this time.    Nabil is progressing well towards his goals.   Pt prognosis is Good.     Pt will continue to benefit from skilled outpatient physical therapy to address the deficits listed in the problem list box on initial evaluation, provide pt/family education and to maximize pt's level of independence in the home and community environment.     Pt's spiritual, cultural and educational needs considered and pt agreeable to plan of care and goals.    Anticipated barriers to physical therapy: None noted    Goals:     Short-Term Goals: 2 weeks  1) The patient will be independent and compliant with initial home exercise program as prescribed by physical therapist. MET 10/2/2019  2) The patient will report ability to ambulate 0.5 miles w/o any AD and pain <5/10 at worst to demonstrate improved tolerance to community ambulation. MET 10/2/2019  3) The patient will complete 15 SL calf raises on each LE to demonstrate improved strength and stability of foot/ankle complex. MET 10/7/2019     Long-Term Goals: 4 weeks  1) Pt to achieve <30% limitation as measured by the FOTO to demonstrate decreased disability. PROGRESSING  2) The patient will report ability to ambulate 1 mile w/o any AD and pain <5/10 at worst to  demonstrate improved tolerance to community ambulation. MET 10/7/2019  3) The patient will complete 20 SL calf raises on each LE to demonstrate improved strength and stability of foot/ankle complex. PROGRESSING    Plan     Continue with current POC addressing strength and ROM of bilat foot/ankle complex and tolerance to repetitive weight bearing and work related tasks    Clayton Delgado, PT

## (undated) DEVICE — BRACE KNEE POST OP HNG PD 17IN

## (undated) DEVICE — PACK SET UP 190 OMC-NS

## (undated) DEVICE — SUT ETHILON 3-0 PS2 18 BLK

## (undated) DEVICE — NDL SAFETY 21G X 1 1/2 ECLPSE

## (undated) DEVICE — TOURNIQUET SB QC DP 44X4IN

## (undated) DEVICE — BANDAGE ESMARK 6X12

## (undated) DEVICE — COVER SURG LIGHT HANDLE

## (undated) DEVICE — SEE MEDLINE ITEM 152487

## (undated) DEVICE — TUBING ARTHROSCOPY

## (undated) DEVICE — BLADE SURG CARBON STEEL SZ11

## (undated) DEVICE — PAD CAST SPECIALIST STRL 6

## (undated) DEVICE — SPONGE GAUZE 16PLY 4X4

## (undated) DEVICE — SEE MEDLINE ITEM 157216

## (undated) DEVICE — SLEEVE SCD EXPRESS CALF MEDIUM

## (undated) DEVICE — NDL BOX COUNTER

## (undated) DEVICE — SEE MEDLINE ITEM 157117

## (undated) DEVICE — NDL SPINAL 18GX3.5 SPINOCAN

## (undated) DEVICE — PACK BASIC

## (undated) DEVICE — GLOVE SURG ULTRA TOUCH 8

## (undated) DEVICE — COOLER ICEMAN COLD THERAPY

## (undated) DEVICE — SOL IRR NACL .9% 3000ML

## (undated) DEVICE — CUTTER MENISCUS AGGRESSIVE 4.0

## (undated) DEVICE — ELECTRODE REM PLYHSV RETURN 9

## (undated) DEVICE — DRAPE STERI U-SHAPED 47X51IN

## (undated) DEVICE — SYR 10CC LUER LOCK

## (undated) DEVICE — SEE MEDLINE ITEM 152530

## (undated) DEVICE — SEE MEDLINE ITEM 157171

## (undated) DEVICE — SEE MEDLINE ITEM 146313

## (undated) DEVICE — MANIFOLD 4 PORT

## (undated) DEVICE — APPLICATOR CHLORAPREP ORN 26ML

## (undated) DEVICE — SEE MEDLINE ITEM 152622

## (undated) DEVICE — SEE MEDLINE ITEM 157116

## (undated) DEVICE — UNDERGLOVES BIOGEL PI SIZE 7.5

## (undated) DEVICE — PAD ICEMAN UNIV WRAP ON REG

## (undated) DEVICE — GOWN B1 X-LG X-LONG

## (undated) DEVICE — DRESSING N ADH OIL EMUL 3X3

## (undated) DEVICE — SEE MEDLINE ITEM 146231

## (undated) DEVICE — STRAP OR TABLE 5IN X 72IN

## (undated) DEVICE — DRAPE PLASTIC U 60X72